# Patient Record
Sex: MALE | Race: WHITE | NOT HISPANIC OR LATINO | URBAN - METROPOLITAN AREA
[De-identification: names, ages, dates, MRNs, and addresses within clinical notes are randomized per-mention and may not be internally consistent; named-entity substitution may affect disease eponyms.]

---

## 2018-04-15 ENCOUNTER — EMERGENCY (EMERGENCY)
Facility: HOSPITAL | Age: 48
LOS: 0 days | Discharge: LEFT BEFORE DISCHARGE | End: 2018-04-16
Attending: EMERGENCY MEDICINE

## 2018-04-15 VITALS
RESPIRATION RATE: 18 BRPM | SYSTOLIC BLOOD PRESSURE: 136 MMHG | OXYGEN SATURATION: 96 % | HEART RATE: 84 BPM | DIASTOLIC BLOOD PRESSURE: 81 MMHG | TEMPERATURE: 98 F

## 2018-04-15 DIAGNOSIS — Z02.9 ENCOUNTER FOR ADMINISTRATIVE EXAMINATIONS, UNSPECIFIED: ICD-10-CM

## 2018-04-15 NOTE — ED ADULT TRIAGE NOTE - CHIEF COMPLAINT QUOTE
lower leg redness and swelling, hx of DVT and PE, had kendrick filter placed 5 years ago. not on blood thinners currently. drove from [place] x 2 weeks ago

## 2018-04-16 NOTE — ED ADULT NURSE NOTE - CHIEF COMPLAINT QUOTE
lower leg redness and swelling, hx of DVT and PE, had kendrick filter placed 5 years ago. not on blood thinners currently. drove from [place] x 2 weeks ago

## 2018-04-18 DIAGNOSIS — Z02.9 ENCOUNTER FOR ADMINISTRATIVE EXAMINATIONS, UNSPECIFIED: ICD-10-CM

## 2018-08-15 PROBLEM — Z00.00 ENCOUNTER FOR PREVENTIVE HEALTH EXAMINATION: Status: ACTIVE | Noted: 2018-08-15

## 2018-08-24 ENCOUNTER — APPOINTMENT (OUTPATIENT)
Dept: VASCULAR SURGERY | Facility: CLINIC | Age: 48
End: 2018-08-24

## 2018-12-17 ENCOUNTER — APPOINTMENT (OUTPATIENT)
Dept: VASCULAR SURGERY | Facility: CLINIC | Age: 48
End: 2018-12-17

## 2019-01-04 ENCOUNTER — OUTPATIENT (OUTPATIENT)
Dept: OUTPATIENT SERVICES | Facility: HOSPITAL | Age: 49
LOS: 1 days | Discharge: HOME | End: 2019-01-04

## 2019-01-04 DIAGNOSIS — F11.20 OPIOID DEPENDENCE, UNCOMPLICATED: ICD-10-CM

## 2019-01-14 ENCOUNTER — OUTPATIENT (OUTPATIENT)
Dept: OUTPATIENT SERVICES | Facility: HOSPITAL | Age: 49
LOS: 1 days | Discharge: HOME | End: 2019-01-14

## 2019-01-14 DIAGNOSIS — F11.20 OPIOID DEPENDENCE, UNCOMPLICATED: ICD-10-CM

## 2019-01-16 ENCOUNTER — APPOINTMENT (OUTPATIENT)
Dept: VASCULAR SURGERY | Facility: CLINIC | Age: 49
End: 2019-01-16
Payer: MEDICAID

## 2019-01-16 DIAGNOSIS — Z86.711 PERSONAL HISTORY OF PULMONARY EMBOLISM: ICD-10-CM

## 2019-01-16 DIAGNOSIS — Z86.79 PERSONAL HISTORY OF OTHER DISEASES OF THE CIRCULATORY SYSTEM: ICD-10-CM

## 2019-01-16 DIAGNOSIS — Z87.898 PERSONAL HISTORY OF OTHER SPECIFIED CONDITIONS: ICD-10-CM

## 2019-01-16 DIAGNOSIS — E11.628 TYPE 2 DIABETES MELLITUS WITH OTHER SKIN COMPLICATIONS: ICD-10-CM

## 2019-01-16 DIAGNOSIS — Z86.718 PERSONAL HISTORY OF OTHER VENOUS THROMBOSIS AND EMBOLISM: ICD-10-CM

## 2019-01-16 PROCEDURE — 99203 OFFICE O/P NEW LOW 30 MIN: CPT

## 2019-01-16 PROCEDURE — 93970 EXTREMITY STUDY: CPT

## 2019-01-16 RX ORDER — BUPRENORPHINE HCL/NALOXONE HCL 8 MG-2 MG
TABLET, SUBLINGUAL SUBLINGUAL
Refills: 0 | Status: ACTIVE | COMMUNITY

## 2019-01-16 NOTE — CONSULT LETTER
[Dear  ___] : Dear  [unfilled], [Consult Letter:] : I had the pleasure of evaluating your patient, [unfilled]. [Please see my note below.] : Please see my note below. [FreeTextEntry2] : Dr. Huynh

## 2019-01-16 NOTE — DATA REVIEWED
[FreeTextEntry1] : Venous duplex of both legs today showed chronic DVT left popliteal vein, no acute DVT.\par \par Venous duplex of IVC showed patent IVC filter.

## 2019-01-16 NOTE — HISTORY OF PRESENT ILLNESS
[FreeTextEntry1] : 48 years old male with history of multiple episodes on DVT/PE, substance abuse, hypertension presents as he wants his IVC filter removed. I performed left leg venous thrombolysis and placement of IVC filter in July 2014 for DVT and PE. He was on coumadin for sometime and than stopped it by himself. He never followed up in the office. Not taking any anticoagulants for few years. Recently released from jail. Has chronic leg swelling and venous stasis changes worse in left leg.

## 2019-01-16 NOTE — PHYSICAL EXAM
[Normal Breath Sounds] : Normal breath sounds [Normal Heart Sounds] : normal heart sounds [2+] : left 2+ [Ankle Swelling (On Exam)] : present [] : bilaterally [Ankle Swelling Bilaterally] : severe [Alert] : alert [Oriented to Person] : oriented to person [Oriented to Place] : oriented to place [Varicose Veins Of Lower Extremities] : not present [Abdomen Masses] : No abdominal masses [Abdomen Tenderness] : ~T ~M No abdominal tenderness [Abdominal Bruit] : no abdominal bruit  [de-identified] : tracheostomy scar [FreeTextEntry1] : Left leg swelling and venous stasis changes worse than right

## 2019-01-18 ENCOUNTER — OUTPATIENT (OUTPATIENT)
Dept: OUTPATIENT SERVICES | Facility: HOSPITAL | Age: 49
LOS: 1 days | Discharge: HOME | End: 2019-01-18

## 2019-01-18 DIAGNOSIS — F11.20 OPIOID DEPENDENCE, UNCOMPLICATED: ICD-10-CM

## 2019-01-28 ENCOUNTER — OUTPATIENT (OUTPATIENT)
Dept: OUTPATIENT SERVICES | Facility: HOSPITAL | Age: 49
LOS: 1 days | Discharge: HOME | End: 2019-01-28

## 2019-01-28 DIAGNOSIS — F11.20 OPIOID DEPENDENCE, UNCOMPLICATED: ICD-10-CM

## 2019-01-30 ENCOUNTER — APPOINTMENT (OUTPATIENT)
Dept: INTERNAL MEDICINE | Facility: HOSPITAL | Age: 49
End: 2019-01-30

## 2019-02-08 ENCOUNTER — APPOINTMENT (OUTPATIENT)
Dept: VASCULAR SURGERY | Facility: HOSPITAL | Age: 49
End: 2019-02-08

## 2019-02-08 ENCOUNTER — OUTPATIENT (OUTPATIENT)
Dept: OUTPATIENT SERVICES | Facility: HOSPITAL | Age: 49
LOS: 1 days | Discharge: HOME | End: 2019-02-08

## 2019-02-08 DIAGNOSIS — F11.20 OPIOID DEPENDENCE, UNCOMPLICATED: ICD-10-CM

## 2019-02-25 ENCOUNTER — OUTPATIENT (OUTPATIENT)
Dept: OUTPATIENT SERVICES | Facility: HOSPITAL | Age: 49
LOS: 1 days | Discharge: HOME | End: 2019-02-25

## 2019-02-25 DIAGNOSIS — F11.20 OPIOID DEPENDENCE, UNCOMPLICATED: ICD-10-CM

## 2019-03-07 ENCOUNTER — OUTPATIENT (OUTPATIENT)
Dept: OUTPATIENT SERVICES | Facility: HOSPITAL | Age: 49
LOS: 1 days | Discharge: HOME | End: 2019-03-07

## 2019-03-07 VITALS
HEART RATE: 76 BPM | TEMPERATURE: 98 F | SYSTOLIC BLOOD PRESSURE: 110 MMHG | HEIGHT: 73 IN | DIASTOLIC BLOOD PRESSURE: 80 MMHG | OXYGEN SATURATION: 98 % | WEIGHT: 286.6 LBS | RESPIRATION RATE: 16 BRPM

## 2019-03-07 DIAGNOSIS — Z86.718 PERSONAL HISTORY OF OTHER VENOUS THROMBOSIS AND EMBOLISM: ICD-10-CM

## 2019-03-07 DIAGNOSIS — F11.20 OPIOID DEPENDENCE, UNCOMPLICATED: ICD-10-CM

## 2019-03-07 DIAGNOSIS — Z01.818 ENCOUNTER FOR OTHER PREPROCEDURAL EXAMINATION: ICD-10-CM

## 2019-03-07 DIAGNOSIS — Z98.890 OTHER SPECIFIED POSTPROCEDURAL STATES: Chronic | ICD-10-CM

## 2019-03-07 DIAGNOSIS — Z79.899 OTHER LONG TERM (CURRENT) DRUG THERAPY: ICD-10-CM

## 2019-03-07 LAB
ALBUMIN SERPL ELPH-MCNC: 4.3 G/DL — SIGNIFICANT CHANGE UP (ref 3.5–5.2)
ALP SERPL-CCNC: 66 U/L — SIGNIFICANT CHANGE UP (ref 30–115)
ALT FLD-CCNC: 21 U/L — SIGNIFICANT CHANGE UP (ref 0–41)
ANION GAP SERPL CALC-SCNC: 14 MMOL/L — SIGNIFICANT CHANGE UP (ref 7–14)
APTT BLD: 29.5 SEC — SIGNIFICANT CHANGE UP (ref 27–39.2)
AST SERPL-CCNC: 16 U/L — SIGNIFICANT CHANGE UP (ref 0–41)
BASOPHILS # BLD AUTO: 0.02 K/UL — SIGNIFICANT CHANGE UP (ref 0–0.2)
BASOPHILS NFR BLD AUTO: 0.4 % — SIGNIFICANT CHANGE UP (ref 0–1)
BILIRUB SERPL-MCNC: 0.6 MG/DL — SIGNIFICANT CHANGE UP (ref 0.2–1.2)
BUN SERPL-MCNC: 19 MG/DL — SIGNIFICANT CHANGE UP (ref 10–20)
CALCIUM SERPL-MCNC: 8.8 MG/DL — SIGNIFICANT CHANGE UP (ref 8.5–10.1)
CHLORIDE SERPL-SCNC: 104 MMOL/L — SIGNIFICANT CHANGE UP (ref 98–110)
CO2 SERPL-SCNC: 24 MMOL/L — SIGNIFICANT CHANGE UP (ref 17–32)
CREAT SERPL-MCNC: 0.9 MG/DL — SIGNIFICANT CHANGE UP (ref 0.7–1.5)
EOSINOPHIL # BLD AUTO: 0.09 K/UL — SIGNIFICANT CHANGE UP (ref 0–0.7)
EOSINOPHIL NFR BLD AUTO: 1.8 % — SIGNIFICANT CHANGE UP (ref 0–8)
GLUCOSE SERPL-MCNC: 199 MG/DL — HIGH (ref 70–99)
HCT VFR BLD CALC: 43.3 % — SIGNIFICANT CHANGE UP (ref 42–52)
HGB BLD-MCNC: 14.4 G/DL — SIGNIFICANT CHANGE UP (ref 14–18)
IMM GRANULOCYTES NFR BLD AUTO: 0.2 % — SIGNIFICANT CHANGE UP (ref 0.1–0.3)
INR BLD: 1.04 RATIO — SIGNIFICANT CHANGE UP (ref 0.65–1.3)
LYMPHOCYTES # BLD AUTO: 1.74 K/UL — SIGNIFICANT CHANGE UP (ref 1.2–3.4)
LYMPHOCYTES # BLD AUTO: 35.5 % — SIGNIFICANT CHANGE UP (ref 20.5–51.1)
MCHC RBC-ENTMCNC: 30.8 PG — SIGNIFICANT CHANGE UP (ref 27–31)
MCHC RBC-ENTMCNC: 33.3 G/DL — SIGNIFICANT CHANGE UP (ref 32–37)
MCV RBC AUTO: 92.5 FL — SIGNIFICANT CHANGE UP (ref 80–94)
MONOCYTES # BLD AUTO: 0.39 K/UL — SIGNIFICANT CHANGE UP (ref 0.1–0.6)
MONOCYTES NFR BLD AUTO: 8 % — SIGNIFICANT CHANGE UP (ref 1.7–9.3)
NEUTROPHILS # BLD AUTO: 2.65 K/UL — SIGNIFICANT CHANGE UP (ref 1.4–6.5)
NEUTROPHILS NFR BLD AUTO: 54.1 % — SIGNIFICANT CHANGE UP (ref 42.2–75.2)
NRBC # BLD: 0 /100 WBCS — SIGNIFICANT CHANGE UP (ref 0–0)
PLATELET # BLD AUTO: 218 K/UL — SIGNIFICANT CHANGE UP (ref 130–400)
POTASSIUM SERPL-MCNC: 4.6 MMOL/L — SIGNIFICANT CHANGE UP (ref 3.5–5)
POTASSIUM SERPL-SCNC: 4.6 MMOL/L — SIGNIFICANT CHANGE UP (ref 3.5–5)
PROT SERPL-MCNC: 6.5 G/DL — SIGNIFICANT CHANGE UP (ref 6–8)
PROTHROM AB SERPL-ACNC: 12 SEC — SIGNIFICANT CHANGE UP (ref 9.95–12.87)
RBC # BLD: 4.68 M/UL — LOW (ref 4.7–6.1)
RBC # FLD: 12.1 % — SIGNIFICANT CHANGE UP (ref 11.5–14.5)
SODIUM SERPL-SCNC: 142 MMOL/L — SIGNIFICANT CHANGE UP (ref 135–146)
WBC # BLD: 4.9 K/UL — SIGNIFICANT CHANGE UP (ref 4.8–10.8)
WBC # FLD AUTO: 4.9 K/UL — SIGNIFICANT CHANGE UP (ref 4.8–10.8)

## 2019-03-07 RX ORDER — BUPRENORPHINE AND NALOXONE 2; .5 MG/1; MG/1
2 TABLET SUBLINGUAL
Qty: 0 | Refills: 0 | COMMUNITY

## 2019-03-07 NOTE — H&P PST ADULT - HISTORY OF PRESENT ILLNESS
47 Y/O MALE PRESNTS TO PAST WITH HX DVT  PT NOW FOR SCHEDULED PROCEDURE. PT DENIES ANY CP SOB PALP COUGH DYSURIA FEVER URI. PT ABLE TO PEE 1-2 FOS W/O SOB

## 2019-03-12 ENCOUNTER — OUTPATIENT (OUTPATIENT)
Dept: OUTPATIENT SERVICES | Facility: HOSPITAL | Age: 49
LOS: 1 days | Discharge: HOME | End: 2019-03-12

## 2019-03-12 DIAGNOSIS — F11.20 OPIOID DEPENDENCE, UNCOMPLICATED: ICD-10-CM

## 2019-03-12 DIAGNOSIS — Z98.890 OTHER SPECIFIED POSTPROCEDURAL STATES: Chronic | ICD-10-CM

## 2019-03-13 PROBLEM — I82.409 ACUTE EMBOLISM AND THROMBOSIS OF UNSPECIFIED DEEP VEINS OF UNSPECIFIED LOWER EXTREMITY: Chronic | Status: ACTIVE | Noted: 2019-03-07

## 2019-03-13 PROBLEM — I26.99 OTHER PULMONARY EMBOLISM WITHOUT ACUTE COR PULMONALE: Chronic | Status: ACTIVE | Noted: 2019-03-07

## 2019-03-21 ENCOUNTER — OUTPATIENT (OUTPATIENT)
Dept: OUTPATIENT SERVICES | Facility: HOSPITAL | Age: 49
LOS: 1 days | Discharge: HOME | End: 2019-03-21

## 2019-03-21 DIAGNOSIS — Z98.890 OTHER SPECIFIED POSTPROCEDURAL STATES: Chronic | ICD-10-CM

## 2019-03-21 DIAGNOSIS — F11.20 OPIOID DEPENDENCE, UNCOMPLICATED: ICD-10-CM

## 2019-04-04 ENCOUNTER — OUTPATIENT (OUTPATIENT)
Dept: OUTPATIENT SERVICES | Facility: HOSPITAL | Age: 49
LOS: 1 days | Discharge: HOME | End: 2019-04-04

## 2019-04-04 DIAGNOSIS — F11.20 OPIOID DEPENDENCE, UNCOMPLICATED: ICD-10-CM

## 2019-04-04 DIAGNOSIS — Z98.890 OTHER SPECIFIED POSTPROCEDURAL STATES: Chronic | ICD-10-CM

## 2019-04-11 ENCOUNTER — OUTPATIENT (OUTPATIENT)
Dept: OUTPATIENT SERVICES | Facility: HOSPITAL | Age: 49
LOS: 1 days | Discharge: HOME | End: 2019-04-11

## 2019-04-11 DIAGNOSIS — F11.20 OPIOID DEPENDENCE, UNCOMPLICATED: ICD-10-CM

## 2019-04-11 DIAGNOSIS — Z98.890 OTHER SPECIFIED POSTPROCEDURAL STATES: Chronic | ICD-10-CM

## 2019-04-25 ENCOUNTER — OUTPATIENT (OUTPATIENT)
Dept: OUTPATIENT SERVICES | Facility: HOSPITAL | Age: 49
LOS: 1 days | Discharge: HOME | End: 2019-04-25

## 2019-04-25 DIAGNOSIS — Z98.890 OTHER SPECIFIED POSTPROCEDURAL STATES: Chronic | ICD-10-CM

## 2019-04-25 DIAGNOSIS — F11.20 OPIOID DEPENDENCE, UNCOMPLICATED: ICD-10-CM

## 2019-05-23 ENCOUNTER — OUTPATIENT (OUTPATIENT)
Dept: OUTPATIENT SERVICES | Facility: HOSPITAL | Age: 49
LOS: 1 days | Discharge: HOME | End: 2019-05-23

## 2019-05-23 DIAGNOSIS — F11.20 OPIOID DEPENDENCE, UNCOMPLICATED: ICD-10-CM

## 2019-05-23 DIAGNOSIS — Z98.890 OTHER SPECIFIED POSTPROCEDURAL STATES: Chronic | ICD-10-CM

## 2019-06-13 ENCOUNTER — OUTPATIENT (OUTPATIENT)
Dept: OUTPATIENT SERVICES | Facility: HOSPITAL | Age: 49
LOS: 1 days | Discharge: HOME | End: 2019-06-13

## 2019-06-13 DIAGNOSIS — F11.20 OPIOID DEPENDENCE, UNCOMPLICATED: ICD-10-CM

## 2019-06-13 DIAGNOSIS — Z98.890 OTHER SPECIFIED POSTPROCEDURAL STATES: Chronic | ICD-10-CM

## 2019-06-19 ENCOUNTER — OUTPATIENT (OUTPATIENT)
Dept: OUTPATIENT SERVICES | Facility: HOSPITAL | Age: 49
LOS: 1 days | Discharge: HOME | End: 2019-06-19

## 2019-06-19 DIAGNOSIS — Z98.890 OTHER SPECIFIED POSTPROCEDURAL STATES: Chronic | ICD-10-CM

## 2019-06-19 DIAGNOSIS — F11.20 OPIOID DEPENDENCE, UNCOMPLICATED: ICD-10-CM

## 2019-06-27 ENCOUNTER — OUTPATIENT (OUTPATIENT)
Dept: OUTPATIENT SERVICES | Facility: HOSPITAL | Age: 49
LOS: 1 days | Discharge: HOME | End: 2019-06-27

## 2019-06-27 DIAGNOSIS — F11.20 OPIOID DEPENDENCE, UNCOMPLICATED: ICD-10-CM

## 2019-06-27 DIAGNOSIS — Z98.890 OTHER SPECIFIED POSTPROCEDURAL STATES: Chronic | ICD-10-CM

## 2020-03-19 ENCOUNTER — APPOINTMENT (OUTPATIENT)
Dept: VASCULAR SURGERY | Facility: CLINIC | Age: 50
End: 2020-03-19
Payer: MEDICAID

## 2020-03-19 PROCEDURE — 99213 OFFICE O/P EST LOW 20 MIN: CPT

## 2020-03-19 PROCEDURE — 93970 EXTREMITY STUDY: CPT

## 2020-03-19 NOTE — HISTORY OF PRESENT ILLNESS
[FreeTextEntry1] : 49 years old male with history of multiple episodes on DVT/PE, substance abuse, hypertension presents as he wants his IVC filter removed. He underwent left leg venous thrombolysis and placement of IVC filter in July 2014 for DVT and PE. He was on Coumadin for sometime and than stopped it by himself. Not taking any anticoagulants for few years. Has chronic leg swelling and venous stasis changes worse in left leg. He c/o pain in the mid back after doing push ups and is concerned about IVC filter.

## 2020-03-19 NOTE — DATA REVIEWED
[FreeTextEntry1] : I performed a venous duplex which was medically necessary to evaluate for DVT. It showed chronic changes in the left popliteal vein and patent IVC filter.\par

## 2020-03-19 NOTE — ASSESSMENT
[FreeTextEntry1] : 49 years old male with history of multiple episodes on DVT/PE, substance abuse, hypertension presents as he wants his IVC filter removed. He underwent left leg venous thrombolysis and placement of IVC filter in July 2014 for DVT and PE. He was on Coumadin for sometime and than stopped it by himself. Not taking any anticoagulants for few years. Has chronic leg swelling and venous stasis changes worse in left leg. He c/o pain in the mid back after doing push ups and is concerned about IVC filter. \par I performed a venous duplex which was medically necessary to evaluate for DVT. It showed chronic changes in the left popliteal vein and patent IVC filter.\par I would like to obtain a CT venogram to further evaluate the patency of the IVC filter and I will see him back after the CT venogram.

## 2020-03-20 LAB
BUN SERPL-MCNC: 15 MG/DL
CREAT SERPL-MCNC: 1 MG/DL

## 2020-04-02 DIAGNOSIS — L03.115 CELLULITIS OF RIGHT LOWER LIMB: ICD-10-CM

## 2020-04-13 ENCOUNTER — APPOINTMENT (OUTPATIENT)
Dept: VASCULAR SURGERY | Facility: CLINIC | Age: 50
End: 2020-04-13
Payer: MEDICAID

## 2020-04-13 PROCEDURE — 99215 OFFICE O/P EST HI 40 MIN: CPT

## 2020-04-13 RX ORDER — CEPHALEXIN 500 MG/1
500 CAPSULE ORAL 4 TIMES DAILY
Qty: 40 | Refills: 0 | Status: ACTIVE | COMMUNITY
Start: 2020-04-02 | End: 1900-01-01

## 2020-04-13 NOTE — ASSESSMENT
[FreeTextEntry1] : Left thigh abscess aspirated with limited success under sterile conditions.  Denies fever, although erythema persists.  Offered admission to hospital for surgical I and D but pt refused.  Will renew Abx and see pt in 1 week,k.  He understands to come to hospital if it worsens or he develops fever

## 2020-04-20 ENCOUNTER — APPOINTMENT (OUTPATIENT)
Dept: VASCULAR SURGERY | Facility: CLINIC | Age: 50
End: 2020-04-20

## 2020-05-19 ENCOUNTER — OUTPATIENT (OUTPATIENT)
Dept: OUTPATIENT SERVICES | Facility: HOSPITAL | Age: 50
LOS: 1 days | Discharge: HOME | End: 2020-05-19
Payer: MEDICAID

## 2020-05-19 DIAGNOSIS — F11.20 OPIOID DEPENDENCE, UNCOMPLICATED: ICD-10-CM

## 2020-05-19 DIAGNOSIS — Z98.890 OTHER SPECIFIED POSTPROCEDURAL STATES: Chronic | ICD-10-CM

## 2020-05-19 LAB
A1C WITH ESTIMATED AVERAGE GLUCOSE RESULT: 5.9 % — HIGH (ref 4–5.6)
ALBUMIN SERPL ELPH-MCNC: 4.5 G/DL — SIGNIFICANT CHANGE UP (ref 3.5–5.2)
ALP SERPL-CCNC: 65 U/L — SIGNIFICANT CHANGE UP (ref 30–115)
ALT FLD-CCNC: 21 U/L — SIGNIFICANT CHANGE UP (ref 0–41)
ANION GAP SERPL CALC-SCNC: 13 MMOL/L — SIGNIFICANT CHANGE UP (ref 7–14)
AST SERPL-CCNC: 19 U/L — SIGNIFICANT CHANGE UP (ref 0–41)
BILIRUB SERPL-MCNC: 0.3 MG/DL — SIGNIFICANT CHANGE UP (ref 0.2–1.2)
BUN SERPL-MCNC: 21 MG/DL — HIGH (ref 10–20)
CALCIUM SERPL-MCNC: 9 MG/DL — SIGNIFICANT CHANGE UP (ref 8.5–10.1)
CHLORIDE SERPL-SCNC: 107 MMOL/L — SIGNIFICANT CHANGE UP (ref 98–110)
CHOLEST SERPL-MCNC: 152 MG/DL — SIGNIFICANT CHANGE UP (ref 100–200)
CO2 SERPL-SCNC: 20 MMOL/L — SIGNIFICANT CHANGE UP (ref 17–32)
CREAT SERPL-MCNC: 0.8 MG/DL — SIGNIFICANT CHANGE UP (ref 0.7–1.5)
ESTIMATED AVERAGE GLUCOSE: 123 MG/DL — HIGH (ref 68–114)
ETHANOL SERPL-MCNC: <10 MG/DL — SIGNIFICANT CHANGE UP
GLUCOSE SERPL-MCNC: 131 MG/DL — HIGH (ref 70–99)
HCT VFR BLD CALC: 40.5 % — LOW (ref 42–52)
HDLC SERPL-MCNC: 60 MG/DL — SIGNIFICANT CHANGE UP
HGB BLD-MCNC: 13.5 G/DL — LOW (ref 14–18)
LIPID PNL WITH DIRECT LDL SERPL: 84 MG/DL — SIGNIFICANT CHANGE UP (ref 4–129)
MAGNESIUM SERPL-MCNC: 1.8 MG/DL — SIGNIFICANT CHANGE UP (ref 1.8–2.4)
MCHC RBC-ENTMCNC: 31.5 PG — HIGH (ref 27–31)
MCHC RBC-ENTMCNC: 33.3 G/DL — SIGNIFICANT CHANGE UP (ref 32–37)
MCV RBC AUTO: 94.6 FL — HIGH (ref 80–94)
NRBC # BLD: 0 /100 WBCS — SIGNIFICANT CHANGE UP (ref 0–0)
PLATELET # BLD AUTO: 278 K/UL — SIGNIFICANT CHANGE UP (ref 130–400)
POTASSIUM SERPL-MCNC: 4.7 MMOL/L — SIGNIFICANT CHANGE UP (ref 3.5–5)
POTASSIUM SERPL-SCNC: 4.7 MMOL/L — SIGNIFICANT CHANGE UP (ref 3.5–5)
PROT SERPL-MCNC: 6.9 G/DL — SIGNIFICANT CHANGE UP (ref 6–8)
RBC # BLD: 4.28 M/UL — LOW (ref 4.7–6.1)
RBC # FLD: 13.1 % — SIGNIFICANT CHANGE UP (ref 11.5–14.5)
SODIUM SERPL-SCNC: 140 MMOL/L — SIGNIFICANT CHANGE UP (ref 135–146)
TOTAL CHOLESTEROL/HDL RATIO MEASUREMENT: 2.5 RATIO — LOW (ref 4–5.5)
TRIGL SERPL-MCNC: 73 MG/DL — SIGNIFICANT CHANGE UP (ref 10–149)
WBC # BLD: 7.05 K/UL — SIGNIFICANT CHANGE UP (ref 4.8–10.8)
WBC # FLD AUTO: 7.05 K/UL — SIGNIFICANT CHANGE UP (ref 4.8–10.8)

## 2020-05-19 PROCEDURE — 99215 OFFICE O/P EST HI 40 MIN: CPT

## 2020-05-20 LAB
APPEARANCE UR: CLEAR — SIGNIFICANT CHANGE UP
BILIRUB UR-MCNC: NEGATIVE — SIGNIFICANT CHANGE UP
COLOR SPEC: SIGNIFICANT CHANGE UP
DIFF PNL FLD: NEGATIVE — SIGNIFICANT CHANGE UP
GLUCOSE UR QL: NEGATIVE — SIGNIFICANT CHANGE UP
HAV IGM SER-ACNC: SIGNIFICANT CHANGE UP
HBV CORE IGM SER-ACNC: SIGNIFICANT CHANGE UP
HBV SURFACE AG SER-ACNC: SIGNIFICANT CHANGE UP
HCV AB S/CO SERPL IA: 0.08 S/CO — SIGNIFICANT CHANGE UP (ref 0–0.99)
HCV AB SERPL-IMP: SIGNIFICANT CHANGE UP
HIV 1+2 AB+HIV1 P24 AG SERPL QL IA: SIGNIFICANT CHANGE UP
KETONES UR-MCNC: NEGATIVE — SIGNIFICANT CHANGE UP
LEUKOCYTE ESTERASE UR-ACNC: NEGATIVE — SIGNIFICANT CHANGE UP
NITRITE UR-MCNC: NEGATIVE — SIGNIFICANT CHANGE UP
PH UR: 6 — SIGNIFICANT CHANGE UP (ref 5–8)
PROT UR-MCNC: NEGATIVE — SIGNIFICANT CHANGE UP
SP GR SPEC: 1.02 — SIGNIFICANT CHANGE UP (ref 1.01–1.02)
T PALLIDUM AB TITR SER: NEGATIVE — SIGNIFICANT CHANGE UP
UROBILINOGEN FLD QL: SIGNIFICANT CHANGE UP

## 2020-05-21 LAB
GAMMA INTERFERON BACKGROUND BLD IA-ACNC: 0.01 IU/ML — SIGNIFICANT CHANGE UP
M TB IFN-G BLD-IMP: NEGATIVE — SIGNIFICANT CHANGE UP
M TB IFN-G CD4+ BCKGRND COR BLD-ACNC: 0 IU/ML — SIGNIFICANT CHANGE UP
M TB IFN-G CD4+CD8+ BCKGRND COR BLD-ACNC: 0 IU/ML — SIGNIFICANT CHANGE UP
QUANT TB PLUS MITOGEN MINUS NIL: 7.59 IU/ML — SIGNIFICANT CHANGE UP

## 2020-05-26 ENCOUNTER — OUTPATIENT (OUTPATIENT)
Dept: OUTPATIENT SERVICES | Facility: HOSPITAL | Age: 50
LOS: 1 days | Discharge: HOME | End: 2020-05-26
Payer: MEDICAID

## 2020-05-26 DIAGNOSIS — Z98.890 OTHER SPECIFIED POSTPROCEDURAL STATES: Chronic | ICD-10-CM

## 2020-05-26 DIAGNOSIS — F11.20 OPIOID DEPENDENCE, UNCOMPLICATED: ICD-10-CM

## 2020-05-26 PROCEDURE — 99212 OFFICE O/P EST SF 10 MIN: CPT

## 2020-05-27 ENCOUNTER — OUTPATIENT (OUTPATIENT)
Dept: OUTPATIENT SERVICES | Facility: HOSPITAL | Age: 50
LOS: 1 days | Discharge: HOME | End: 2020-05-27

## 2020-05-27 DIAGNOSIS — Z98.890 OTHER SPECIFIED POSTPROCEDURAL STATES: Chronic | ICD-10-CM

## 2020-05-27 DIAGNOSIS — F11.20 OPIOID DEPENDENCE, UNCOMPLICATED: ICD-10-CM

## 2020-06-05 ENCOUNTER — OUTPATIENT (OUTPATIENT)
Dept: OUTPATIENT SERVICES | Facility: HOSPITAL | Age: 50
LOS: 1 days | Discharge: HOME | End: 2020-06-05
Payer: MEDICAID

## 2020-06-05 DIAGNOSIS — F11.20 OPIOID DEPENDENCE, UNCOMPLICATED: ICD-10-CM

## 2020-06-05 DIAGNOSIS — Z98.890 OTHER SPECIFIED POSTPROCEDURAL STATES: Chronic | ICD-10-CM

## 2020-06-05 PROCEDURE — 99212 OFFICE O/P EST SF 10 MIN: CPT

## 2020-06-10 ENCOUNTER — OUTPATIENT (OUTPATIENT)
Dept: OUTPATIENT SERVICES | Facility: HOSPITAL | Age: 50
LOS: 1 days | Discharge: HOME | End: 2020-06-10

## 2020-06-10 DIAGNOSIS — Z98.890 OTHER SPECIFIED POSTPROCEDURAL STATES: Chronic | ICD-10-CM

## 2020-06-10 DIAGNOSIS — F11.20 OPIOID DEPENDENCE, UNCOMPLICATED: ICD-10-CM

## 2020-06-24 ENCOUNTER — OUTPATIENT (OUTPATIENT)
Dept: OUTPATIENT SERVICES | Facility: HOSPITAL | Age: 50
LOS: 1 days | Discharge: HOME | End: 2020-06-24
Payer: MEDICAID

## 2020-06-24 DIAGNOSIS — F11.20 OPIOID DEPENDENCE, UNCOMPLICATED: ICD-10-CM

## 2020-06-24 DIAGNOSIS — Z98.890 OTHER SPECIFIED POSTPROCEDURAL STATES: Chronic | ICD-10-CM

## 2020-06-24 PROCEDURE — 99212 OFFICE O/P EST SF 10 MIN: CPT

## 2020-07-15 ENCOUNTER — OUTPATIENT (OUTPATIENT)
Dept: OUTPATIENT SERVICES | Facility: HOSPITAL | Age: 50
LOS: 1 days | Discharge: HOME | End: 2020-07-15
Payer: MEDICAID

## 2020-07-15 DIAGNOSIS — F11.20 OPIOID DEPENDENCE, UNCOMPLICATED: ICD-10-CM

## 2020-07-15 DIAGNOSIS — Z98.890 OTHER SPECIFIED POSTPROCEDURAL STATES: Chronic | ICD-10-CM

## 2020-07-15 PROCEDURE — 99212 OFFICE O/P EST SF 10 MIN: CPT

## 2020-08-12 ENCOUNTER — OUTPATIENT (OUTPATIENT)
Dept: OUTPATIENT SERVICES | Facility: HOSPITAL | Age: 50
LOS: 1 days | Discharge: HOME | End: 2020-08-12
Payer: MEDICAID

## 2020-08-12 DIAGNOSIS — F11.20 OPIOID DEPENDENCE, UNCOMPLICATED: ICD-10-CM

## 2020-08-12 DIAGNOSIS — Z98.890 OTHER SPECIFIED POSTPROCEDURAL STATES: Chronic | ICD-10-CM

## 2020-08-12 PROCEDURE — 99212 OFFICE O/P EST SF 10 MIN: CPT

## 2020-09-15 ENCOUNTER — OUTPATIENT (OUTPATIENT)
Dept: OUTPATIENT SERVICES | Facility: HOSPITAL | Age: 50
LOS: 1 days | Discharge: HOME | End: 2020-09-15

## 2020-09-15 DIAGNOSIS — F11.20 OPIOID DEPENDENCE, UNCOMPLICATED: ICD-10-CM

## 2020-09-15 DIAGNOSIS — Z98.890 OTHER SPECIFIED POSTPROCEDURAL STATES: Chronic | ICD-10-CM

## 2020-10-08 ENCOUNTER — OUTPATIENT (OUTPATIENT)
Dept: OUTPATIENT SERVICES | Facility: HOSPITAL | Age: 50
LOS: 1 days | Discharge: HOME | End: 2020-10-08

## 2020-10-08 DIAGNOSIS — Z98.890 OTHER SPECIFIED POSTPROCEDURAL STATES: Chronic | ICD-10-CM

## 2020-10-08 DIAGNOSIS — F11.20 OPIOID DEPENDENCE, UNCOMPLICATED: ICD-10-CM

## 2020-11-05 ENCOUNTER — OUTPATIENT (OUTPATIENT)
Dept: OUTPATIENT SERVICES | Facility: HOSPITAL | Age: 50
LOS: 1 days | Discharge: HOME | End: 2020-11-05

## 2020-11-05 DIAGNOSIS — Z98.890 OTHER SPECIFIED POSTPROCEDURAL STATES: Chronic | ICD-10-CM

## 2020-11-05 DIAGNOSIS — F11.20 OPIOID DEPENDENCE, UNCOMPLICATED: ICD-10-CM

## 2020-11-07 ENCOUNTER — TRANSCRIPTION ENCOUNTER (OUTPATIENT)
Age: 50
End: 2020-11-07

## 2020-11-11 ENCOUNTER — OUTPATIENT (OUTPATIENT)
Dept: OUTPATIENT SERVICES | Facility: HOSPITAL | Age: 50
LOS: 1 days | Discharge: HOME | End: 2020-11-11

## 2020-11-11 DIAGNOSIS — Z98.890 OTHER SPECIFIED POSTPROCEDURAL STATES: Chronic | ICD-10-CM

## 2020-11-11 DIAGNOSIS — F11.20 OPIOID DEPENDENCE, UNCOMPLICATED: ICD-10-CM

## 2020-12-08 ENCOUNTER — OUTPATIENT (OUTPATIENT)
Dept: OUTPATIENT SERVICES | Facility: HOSPITAL | Age: 50
LOS: 1 days | Discharge: HOME | End: 2020-12-08

## 2020-12-08 DIAGNOSIS — F11.20 OPIOID DEPENDENCE, UNCOMPLICATED: ICD-10-CM

## 2020-12-08 DIAGNOSIS — Z98.890 OTHER SPECIFIED POSTPROCEDURAL STATES: Chronic | ICD-10-CM

## 2021-01-13 ENCOUNTER — OUTPATIENT (OUTPATIENT)
Dept: OUTPATIENT SERVICES | Facility: HOSPITAL | Age: 51
LOS: 1 days | Discharge: HOME | End: 2021-01-13

## 2021-01-13 DIAGNOSIS — F11.20 OPIOID DEPENDENCE, UNCOMPLICATED: ICD-10-CM

## 2021-01-13 DIAGNOSIS — Z98.890 OTHER SPECIFIED POSTPROCEDURAL STATES: Chronic | ICD-10-CM

## 2021-02-09 ENCOUNTER — OUTPATIENT (OUTPATIENT)
Dept: OUTPATIENT SERVICES | Facility: HOSPITAL | Age: 51
LOS: 1 days | Discharge: HOME | End: 2021-02-09

## 2021-02-09 DIAGNOSIS — F11.20 OPIOID DEPENDENCE, UNCOMPLICATED: ICD-10-CM

## 2021-02-09 DIAGNOSIS — Z98.890 OTHER SPECIFIED POSTPROCEDURAL STATES: Chronic | ICD-10-CM

## 2021-02-18 ENCOUNTER — APPOINTMENT (OUTPATIENT)
Dept: PSYCHIATRY | Facility: CLINIC | Age: 51
End: 2021-02-18

## 2021-02-18 ENCOUNTER — OUTPATIENT (OUTPATIENT)
Dept: OUTPATIENT SERVICES | Facility: HOSPITAL | Age: 51
LOS: 1 days | Discharge: HOME | End: 2021-02-18

## 2021-02-18 DIAGNOSIS — Z98.890 OTHER SPECIFIED POSTPROCEDURAL STATES: Chronic | ICD-10-CM

## 2021-02-18 DIAGNOSIS — F11.20 OPIOID DEPENDENCE, UNCOMPLICATED: ICD-10-CM

## 2021-03-08 ENCOUNTER — OUTPATIENT (OUTPATIENT)
Dept: OUTPATIENT SERVICES | Facility: HOSPITAL | Age: 51
LOS: 1 days | Discharge: HOME | End: 2021-03-08

## 2021-03-08 DIAGNOSIS — Z98.890 OTHER SPECIFIED POSTPROCEDURAL STATES: Chronic | ICD-10-CM

## 2021-03-08 DIAGNOSIS — F11.20 OPIOID DEPENDENCE, UNCOMPLICATED: ICD-10-CM

## 2021-03-16 ENCOUNTER — OUTPATIENT (OUTPATIENT)
Dept: OUTPATIENT SERVICES | Facility: HOSPITAL | Age: 51
LOS: 1 days | Discharge: HOME | End: 2021-03-16

## 2021-03-16 DIAGNOSIS — F11.20 OPIOID DEPENDENCE, UNCOMPLICATED: ICD-10-CM

## 2021-03-16 DIAGNOSIS — Z98.890 OTHER SPECIFIED POSTPROCEDURAL STATES: Chronic | ICD-10-CM

## 2021-04-14 ENCOUNTER — APPOINTMENT (OUTPATIENT)
Dept: PSYCHIATRY | Facility: CLINIC | Age: 51
End: 2021-04-14

## 2021-04-14 ENCOUNTER — OUTPATIENT (OUTPATIENT)
Dept: OUTPATIENT SERVICES | Facility: HOSPITAL | Age: 51
LOS: 1 days | Discharge: HOME | End: 2021-04-14

## 2021-04-14 ENCOUNTER — EMERGENCY (EMERGENCY)
Facility: HOSPITAL | Age: 51
LOS: 0 days | Discharge: HOME | End: 2021-04-14
Attending: EMERGENCY MEDICINE | Admitting: EMERGENCY MEDICINE
Payer: MEDICAID

## 2021-04-14 VITALS
HEIGHT: 73 IN | WEIGHT: 274.92 LBS | SYSTOLIC BLOOD PRESSURE: 130 MMHG | TEMPERATURE: 97 F | RESPIRATION RATE: 20 BRPM | HEART RATE: 75 BPM | OXYGEN SATURATION: 96 % | DIASTOLIC BLOOD PRESSURE: 78 MMHG

## 2021-04-14 DIAGNOSIS — M79.662 PAIN IN LEFT LOWER LEG: ICD-10-CM

## 2021-04-14 DIAGNOSIS — Z79.899 OTHER LONG TERM (CURRENT) DRUG THERAPY: ICD-10-CM

## 2021-04-14 DIAGNOSIS — Z98.890 OTHER SPECIFIED POSTPROCEDURAL STATES: Chronic | ICD-10-CM

## 2021-04-14 DIAGNOSIS — Z98.890 OTHER SPECIFIED POSTPROCEDURAL STATES: ICD-10-CM

## 2021-04-14 DIAGNOSIS — F11.20 OPIOID DEPENDENCE, UNCOMPLICATED: ICD-10-CM

## 2021-04-14 DIAGNOSIS — Z79.2 LONG TERM (CURRENT) USE OF ANTIBIOTICS: ICD-10-CM

## 2021-04-14 DIAGNOSIS — Z86.718 PERSONAL HISTORY OF OTHER VENOUS THROMBOSIS AND EMBOLISM: ICD-10-CM

## 2021-04-14 DIAGNOSIS — Z86.711 PERSONAL HISTORY OF PULMONARY EMBOLISM: ICD-10-CM

## 2021-04-14 DIAGNOSIS — L03.116 CELLULITIS OF LEFT LOWER LIMB: ICD-10-CM

## 2021-04-14 PROCEDURE — 99283 EMERGENCY DEPT VISIT LOW MDM: CPT

## 2021-04-14 RX ORDER — CEPHALEXIN 500 MG
1 CAPSULE ORAL
Qty: 20 | Refills: 0
Start: 2021-04-14 | End: 2021-04-23

## 2021-04-14 NOTE — ED PROVIDER NOTE - ATTENDING CONTRIBUTION TO CARE
49 yo M presents with c/o wounds to LLE x 1 week.  Pt states he follows with vascular and always has discoloration of skin.  Wounds are new.  Has been on abx in past with improvement.  On exam pt in NAD AAO x 3, + chronic skin changes to LLE, + small wounds x 4 to LLE, no warmth, mild erythma, no calf tenderness, + distal pulses,

## 2021-04-14 NOTE — ED PROVIDER NOTE - CARE PROVIDERS DIRECT ADDRESSES
----- Message from Renetta Downs PA-C sent at 4/23/2019  3:16 PM CDT -----  See if food panel was done ,DirectAddress_Unknown

## 2021-04-14 NOTE — ED PROVIDER NOTE - NS ED ROS FT
Review of Systems:  	•	CONSTITUTIONAL - no fever, no diaphoresis, no chills  	•	SKIN - no rash  	•	HEMATOLOGIC - no bleeding, no bruising  	•	EYES - no eye pain, no blurry vision  	•	ENT - no change in hearing, no sore throat, no ear pain or tinnitus  	•	RESPIRATORY - no shortness of breath, no cough  	•	CARDIAC - no chest pain, no palpitations  	  	•	MUSCULOSKELETAL - no joint paint, no swelling, left leg redness  	•	NEUROLOGIC - no weakness, no headache, no paresthesias, no LOC  	•	PSYCH - no anxiety, non suicidal, non homicidal, no hallucination, no depression

## 2021-04-14 NOTE — ED ADULT TRIAGE NOTE - BP NONINVASIVE DIASTOLIC (MM HG)
Problem: Patient Care Overview  Goal: Plan of Care Review  Outcome: Ongoing (interventions implemented as appropriate)  Spoke with the patient and his spouse regarding the plan of care. Encouraged patient and spouse to ask questions about possible upcoming surgery for LVAD placement and to join support groups. Verbalized understanding will continue to monitor.        78

## 2021-04-14 NOTE — ED PROVIDER NOTE - PHYSICAL EXAMINATION
--EXAM--  VITAL SIGNS: I have reviewed vs documented at present.  CONSTITUTIONAL: Well-developed; well-nourished; in no acute distress.   SKIN: Warm and dry, no acute rash.   HEAD: Normocephalic; atraumatic.    NECK: Supple; non tender.  CARD: S1, S2, Regular rate and rhythm.   RESP: No wheezes, rales or rhonchi.    EXT: Normal ROM. left leg there is chronic changes  noted to leg. there is vascular ulcers noted. there is surrounding erythema   NEURO: Alert, oriented, grossly unremarkable. Strength 5/5 in all extremities. Sensation intact throughout.  PSYCH: Cooperative, appropriate.

## 2021-04-14 NOTE — ED PROVIDER NOTE - CLINICAL SUMMARY MEDICAL DECISION MAKING FREE TEXT BOX
will start trial of po abx.  Pt to f/u with his Vascular specialist. Strict return precautions discussed.   Pt instructed to return if any worsening symptoms or concerns.  They verbalize understanding.

## 2021-04-14 NOTE — ED PROVIDER NOTE - PATIENT PORTAL LINK FT
You can access the FollowMyHealth Patient Portal offered by [facility] by registering at the following website: [email or web address]. By joining [employer]’s FollowMyHealth portal, you will also be able to view your health information using other applications (apps) compatible with our system.

## 2021-04-19 ENCOUNTER — APPOINTMENT (OUTPATIENT)
Dept: VASCULAR SURGERY | Facility: CLINIC | Age: 51
End: 2021-04-19
Payer: MEDICAID

## 2021-04-19 VITALS
HEIGHT: 74 IN | WEIGHT: 220 LBS | TEMPERATURE: 97.5 F | HEART RATE: 80 BPM | SYSTOLIC BLOOD PRESSURE: 90 MMHG | BODY MASS INDEX: 28.23 KG/M2 | DIASTOLIC BLOOD PRESSURE: 60 MMHG

## 2021-04-19 DIAGNOSIS — I82.220 ACUTE EMBOLISM AND THROMBOSIS OF INFERIOR VENA CAVA: ICD-10-CM

## 2021-04-19 DIAGNOSIS — I82.409 ACUTE EMBOLISM AND THROMBOSIS OF UNSPECIFIED DEEP VEINS OF UNSPECIFIED LOWER EXTREMITY: ICD-10-CM

## 2021-04-19 DIAGNOSIS — M79.89 OTHER SPECIFIED SOFT TISSUE DISORDERS: ICD-10-CM

## 2021-04-19 PROCEDURE — 99213 OFFICE O/P EST LOW 20 MIN: CPT

## 2021-04-19 PROCEDURE — 93970 EXTREMITY STUDY: CPT

## 2021-04-19 PROCEDURE — 99072 ADDL SUPL MATRL&STAF TM PHE: CPT

## 2021-04-20 PROBLEM — I82.409 DVT (DEEP VENOUS THROMBOSIS): Status: ACTIVE | Noted: 2020-03-19

## 2021-04-20 PROBLEM — I82.220 IVC THROMBOSIS: Status: ACTIVE | Noted: 2020-03-19

## 2021-04-20 PROBLEM — M79.89 LEG SWELLING: Status: ACTIVE | Noted: 2021-04-20

## 2021-04-20 NOTE — ASSESSMENT
[FreeTextEntry1] : 50 years old male, with history of IVC filter placement.\par Now has complaints of L>R leg swelling and ulcers.\par At this time, there is no active ulceration.\par \par Venous duplex shows patent deep veins, and patent IVC with filter in place, and no thromgus.\par \par Recommended using compression stockings every day.\par Will see him in clinic in 6 months for re-evaluation.

## 2021-04-20 NOTE — HISTORY OF PRESENT ILLNESS
[FreeTextEntry1] : 50 years old male with history of lower extremity DVT and PE x2.\par In 2019, he underwent IVC filter placement.\par He is complaining of on/off ulcers in the lower extremities (L>R)\par and swelling in bilateral lower extremities.

## 2021-04-23 ENCOUNTER — APPOINTMENT (OUTPATIENT)
Dept: PSYCHIATRY | Facility: CLINIC | Age: 51
End: 2021-04-23

## 2021-04-23 ENCOUNTER — OUTPATIENT (OUTPATIENT)
Dept: OUTPATIENT SERVICES | Facility: HOSPITAL | Age: 51
LOS: 1 days | Discharge: HOME | End: 2021-04-23

## 2021-04-23 DIAGNOSIS — F11.20 OPIOID DEPENDENCE, UNCOMPLICATED: ICD-10-CM

## 2021-04-23 DIAGNOSIS — Z98.890 OTHER SPECIFIED POSTPROCEDURAL STATES: Chronic | ICD-10-CM

## 2021-04-26 ENCOUNTER — OUTPATIENT (OUTPATIENT)
Dept: OUTPATIENT SERVICES | Facility: HOSPITAL | Age: 51
LOS: 1 days | Discharge: HOME | End: 2021-04-26

## 2021-04-26 ENCOUNTER — APPOINTMENT (OUTPATIENT)
Dept: PSYCHIATRY | Facility: CLINIC | Age: 51
End: 2021-04-26

## 2021-04-26 DIAGNOSIS — F11.20 OPIOID DEPENDENCE, UNCOMPLICATED: ICD-10-CM

## 2021-04-26 DIAGNOSIS — Z98.890 OTHER SPECIFIED POSTPROCEDURAL STATES: Chronic | ICD-10-CM

## 2021-05-04 ENCOUNTER — APPOINTMENT (OUTPATIENT)
Dept: PSYCHIATRY | Facility: CLINIC | Age: 51
End: 2021-05-04

## 2021-05-04 ENCOUNTER — OUTPATIENT (OUTPATIENT)
Dept: OUTPATIENT SERVICES | Facility: HOSPITAL | Age: 51
LOS: 1 days | Discharge: HOME | End: 2021-05-04

## 2021-05-04 DIAGNOSIS — F11.20 OPIOID DEPENDENCE, UNCOMPLICATED: ICD-10-CM

## 2021-05-04 DIAGNOSIS — Z98.890 OTHER SPECIFIED POSTPROCEDURAL STATES: Chronic | ICD-10-CM

## 2021-05-10 ENCOUNTER — APPOINTMENT (OUTPATIENT)
Dept: PSYCHIATRY | Facility: CLINIC | Age: 51
End: 2021-05-10

## 2021-05-12 ENCOUNTER — OUTPATIENT (OUTPATIENT)
Dept: OUTPATIENT SERVICES | Facility: HOSPITAL | Age: 51
LOS: 1 days | Discharge: HOME | End: 2021-05-12

## 2021-05-12 ENCOUNTER — APPOINTMENT (OUTPATIENT)
Dept: PSYCHIATRY | Facility: CLINIC | Age: 51
End: 2021-05-12

## 2021-05-12 DIAGNOSIS — Z98.890 OTHER SPECIFIED POSTPROCEDURAL STATES: Chronic | ICD-10-CM

## 2021-05-12 DIAGNOSIS — F11.20 OPIOID DEPENDENCE, UNCOMPLICATED: ICD-10-CM

## 2021-05-17 ENCOUNTER — OUTPATIENT (OUTPATIENT)
Dept: OUTPATIENT SERVICES | Facility: HOSPITAL | Age: 51
LOS: 1 days | Discharge: HOME | End: 2021-05-17

## 2021-05-17 ENCOUNTER — APPOINTMENT (OUTPATIENT)
Dept: PSYCHIATRY | Facility: CLINIC | Age: 51
End: 2021-05-17

## 2021-05-17 DIAGNOSIS — Z98.890 OTHER SPECIFIED POSTPROCEDURAL STATES: Chronic | ICD-10-CM

## 2021-05-17 DIAGNOSIS — F11.20 OPIOID DEPENDENCE, UNCOMPLICATED: ICD-10-CM

## 2021-05-24 ENCOUNTER — OUTPATIENT (OUTPATIENT)
Dept: OUTPATIENT SERVICES | Facility: HOSPITAL | Age: 51
LOS: 1 days | Discharge: HOME | End: 2021-05-24

## 2021-05-24 ENCOUNTER — APPOINTMENT (OUTPATIENT)
Dept: PSYCHIATRY | Facility: CLINIC | Age: 51
End: 2021-05-24

## 2021-05-24 DIAGNOSIS — F11.20 OPIOID DEPENDENCE, UNCOMPLICATED: ICD-10-CM

## 2021-05-24 DIAGNOSIS — Z98.890 OTHER SPECIFIED POSTPROCEDURAL STATES: Chronic | ICD-10-CM

## 2021-06-08 ENCOUNTER — APPOINTMENT (OUTPATIENT)
Dept: PSYCHIATRY | Facility: CLINIC | Age: 51
End: 2021-06-08

## 2021-06-08 ENCOUNTER — OUTPATIENT (OUTPATIENT)
Dept: OUTPATIENT SERVICES | Facility: HOSPITAL | Age: 51
LOS: 1 days | Discharge: HOME | End: 2021-06-08

## 2021-06-08 DIAGNOSIS — F11.20 OPIOID DEPENDENCE, UNCOMPLICATED: ICD-10-CM

## 2021-06-08 DIAGNOSIS — Z98.890 OTHER SPECIFIED POSTPROCEDURAL STATES: Chronic | ICD-10-CM

## 2021-06-10 ENCOUNTER — APPOINTMENT (OUTPATIENT)
Dept: PSYCHIATRY | Facility: CLINIC | Age: 51
End: 2021-06-10

## 2021-06-15 ENCOUNTER — OUTPATIENT (OUTPATIENT)
Dept: OUTPATIENT SERVICES | Facility: HOSPITAL | Age: 51
LOS: 1 days | Discharge: HOME | End: 2021-06-15

## 2021-06-15 ENCOUNTER — APPOINTMENT (OUTPATIENT)
Dept: PSYCHIATRY | Facility: CLINIC | Age: 51
End: 2021-06-15

## 2021-06-15 DIAGNOSIS — F11.20 OPIOID DEPENDENCE, UNCOMPLICATED: ICD-10-CM

## 2021-06-15 DIAGNOSIS — Z98.890 OTHER SPECIFIED POSTPROCEDURAL STATES: Chronic | ICD-10-CM

## 2021-07-13 ENCOUNTER — APPOINTMENT (OUTPATIENT)
Dept: PSYCHIATRY | Facility: CLINIC | Age: 51
End: 2021-07-13

## 2021-07-13 ENCOUNTER — OUTPATIENT (OUTPATIENT)
Dept: OUTPATIENT SERVICES | Facility: HOSPITAL | Age: 51
LOS: 1 days | Discharge: HOME | End: 2021-07-13

## 2021-07-13 DIAGNOSIS — F11.20 OPIOID DEPENDENCE, UNCOMPLICATED: ICD-10-CM

## 2021-07-13 DIAGNOSIS — Z98.890 OTHER SPECIFIED POSTPROCEDURAL STATES: Chronic | ICD-10-CM

## 2021-07-16 ENCOUNTER — OUTPATIENT (OUTPATIENT)
Dept: OUTPATIENT SERVICES | Facility: HOSPITAL | Age: 51
LOS: 1 days | Discharge: HOME | End: 2021-07-16
Payer: MEDICAID

## 2021-07-16 ENCOUNTER — APPOINTMENT (OUTPATIENT)
Dept: PSYCHIATRY | Facility: CLINIC | Age: 51
End: 2021-07-16

## 2021-07-16 DIAGNOSIS — F11.20 OPIOID DEPENDENCE, UNCOMPLICATED: ICD-10-CM

## 2021-07-16 DIAGNOSIS — Z98.890 OTHER SPECIFIED POSTPROCEDURAL STATES: Chronic | ICD-10-CM

## 2021-07-16 PROCEDURE — 99213 OFFICE O/P EST LOW 20 MIN: CPT

## 2021-07-28 ENCOUNTER — APPOINTMENT (OUTPATIENT)
Dept: PSYCHIATRY | Facility: CLINIC | Age: 51
End: 2021-07-28

## 2021-07-28 ENCOUNTER — OUTPATIENT (OUTPATIENT)
Dept: OUTPATIENT SERVICES | Facility: HOSPITAL | Age: 51
LOS: 1 days | Discharge: HOME | End: 2021-07-28

## 2021-07-28 DIAGNOSIS — F11.20 OPIOID DEPENDENCE, UNCOMPLICATED: ICD-10-CM

## 2021-07-28 DIAGNOSIS — Z98.890 OTHER SPECIFIED POSTPROCEDURAL STATES: Chronic | ICD-10-CM

## 2021-08-06 ENCOUNTER — APPOINTMENT (OUTPATIENT)
Dept: PSYCHIATRY | Facility: CLINIC | Age: 51
End: 2021-08-06

## 2021-08-09 ENCOUNTER — APPOINTMENT (OUTPATIENT)
Dept: PSYCHIATRY | Facility: CLINIC | Age: 51
End: 2021-08-09

## 2021-08-09 ENCOUNTER — OUTPATIENT (OUTPATIENT)
Dept: OUTPATIENT SERVICES | Facility: HOSPITAL | Age: 51
LOS: 1 days | Discharge: HOME | End: 2021-08-09
Payer: MEDICAID

## 2021-08-09 DIAGNOSIS — F11.20 OPIOID DEPENDENCE, UNCOMPLICATED: ICD-10-CM

## 2021-08-09 DIAGNOSIS — Z98.890 OTHER SPECIFIED POSTPROCEDURAL STATES: Chronic | ICD-10-CM

## 2021-08-09 PROCEDURE — 99213 OFFICE O/P EST LOW 20 MIN: CPT

## 2021-08-23 ENCOUNTER — APPOINTMENT (OUTPATIENT)
Dept: PSYCHIATRY | Facility: CLINIC | Age: 51
End: 2021-08-23

## 2021-08-23 ENCOUNTER — OUTPATIENT (OUTPATIENT)
Dept: OUTPATIENT SERVICES | Facility: HOSPITAL | Age: 51
LOS: 1 days | Discharge: HOME | End: 2021-08-23

## 2021-08-23 DIAGNOSIS — F11.20 OPIOID DEPENDENCE, UNCOMPLICATED: ICD-10-CM

## 2021-08-23 DIAGNOSIS — Z98.890 OTHER SPECIFIED POSTPROCEDURAL STATES: Chronic | ICD-10-CM

## 2021-09-07 ENCOUNTER — APPOINTMENT (OUTPATIENT)
Dept: PSYCHIATRY | Facility: CLINIC | Age: 51
End: 2021-09-07

## 2021-09-08 ENCOUNTER — OUTPATIENT (OUTPATIENT)
Dept: OUTPATIENT SERVICES | Facility: HOSPITAL | Age: 51
LOS: 1 days | Discharge: HOME | End: 2021-09-08

## 2021-09-08 ENCOUNTER — APPOINTMENT (OUTPATIENT)
Dept: PSYCHIATRY | Facility: CLINIC | Age: 51
End: 2021-09-08
Payer: MEDICAID

## 2021-09-08 DIAGNOSIS — Z98.890 OTHER SPECIFIED POSTPROCEDURAL STATES: Chronic | ICD-10-CM

## 2021-09-08 DIAGNOSIS — F11.20 OPIOID DEPENDENCE, UNCOMPLICATED: ICD-10-CM

## 2021-09-08 PROCEDURE — ZZZZZ: CPT

## 2021-09-15 ENCOUNTER — APPOINTMENT (OUTPATIENT)
Dept: PSYCHIATRY | Facility: CLINIC | Age: 51
End: 2021-09-15

## 2021-09-15 ENCOUNTER — OUTPATIENT (OUTPATIENT)
Dept: OUTPATIENT SERVICES | Facility: HOSPITAL | Age: 51
LOS: 1 days | Discharge: HOME | End: 2021-09-15

## 2021-09-15 DIAGNOSIS — Z98.890 OTHER SPECIFIED POSTPROCEDURAL STATES: Chronic | ICD-10-CM

## 2021-09-15 DIAGNOSIS — F11.20 OPIOID DEPENDENCE, UNCOMPLICATED: ICD-10-CM

## 2021-09-29 ENCOUNTER — APPOINTMENT (OUTPATIENT)
Dept: PSYCHIATRY | Facility: CLINIC | Age: 51
End: 2021-09-29

## 2021-10-06 ENCOUNTER — APPOINTMENT (OUTPATIENT)
Dept: PSYCHIATRY | Facility: CLINIC | Age: 51
End: 2021-10-06

## 2021-10-15 ENCOUNTER — OUTPATIENT (OUTPATIENT)
Dept: OUTPATIENT SERVICES | Facility: HOSPITAL | Age: 51
LOS: 1 days | Discharge: HOME | End: 2021-10-15

## 2021-10-15 ENCOUNTER — APPOINTMENT (OUTPATIENT)
Dept: PSYCHIATRY | Facility: CLINIC | Age: 51
End: 2021-10-15

## 2021-10-15 DIAGNOSIS — F11.20 OPIOID DEPENDENCE, UNCOMPLICATED: ICD-10-CM

## 2021-10-15 DIAGNOSIS — Z98.890 OTHER SPECIFIED POSTPROCEDURAL STATES: Chronic | ICD-10-CM

## 2021-10-18 ENCOUNTER — APPOINTMENT (OUTPATIENT)
Dept: VASCULAR SURGERY | Facility: CLINIC | Age: 51
End: 2021-10-18

## 2021-10-29 ENCOUNTER — APPOINTMENT (OUTPATIENT)
Dept: PSYCHIATRY | Facility: CLINIC | Age: 51
End: 2021-10-29

## 2021-11-12 ENCOUNTER — APPOINTMENT (OUTPATIENT)
Dept: PSYCHIATRY | Facility: CLINIC | Age: 51
End: 2021-11-12

## 2021-11-12 ENCOUNTER — OUTPATIENT (OUTPATIENT)
Dept: OUTPATIENT SERVICES | Facility: HOSPITAL | Age: 51
LOS: 1 days | Discharge: HOME | End: 2021-11-12
Payer: MEDICAID

## 2021-11-12 DIAGNOSIS — Z98.890 OTHER SPECIFIED POSTPROCEDURAL STATES: Chronic | ICD-10-CM

## 2021-11-12 DIAGNOSIS — F11.20 OPIOID DEPENDENCE, UNCOMPLICATED: ICD-10-CM

## 2021-11-12 PROCEDURE — 99212 OFFICE O/P EST SF 10 MIN: CPT

## 2021-11-17 ENCOUNTER — APPOINTMENT (OUTPATIENT)
Dept: PSYCHIATRY | Facility: CLINIC | Age: 51
End: 2021-11-17

## 2021-11-18 ENCOUNTER — APPOINTMENT (OUTPATIENT)
Dept: PSYCHIATRY | Facility: CLINIC | Age: 51
End: 2021-11-18

## 2021-12-07 ENCOUNTER — APPOINTMENT (OUTPATIENT)
Dept: PSYCHIATRY | Facility: CLINIC | Age: 51
End: 2021-12-07

## 2021-12-07 ENCOUNTER — OUTPATIENT (OUTPATIENT)
Dept: OUTPATIENT SERVICES | Facility: HOSPITAL | Age: 51
LOS: 1 days | Discharge: HOME | End: 2021-12-07

## 2021-12-07 DIAGNOSIS — F10.20 ALCOHOL DEPENDENCE, UNCOMPLICATED: ICD-10-CM

## 2021-12-07 DIAGNOSIS — Z98.890 OTHER SPECIFIED POSTPROCEDURAL STATES: Chronic | ICD-10-CM

## 2021-12-13 ENCOUNTER — APPOINTMENT (OUTPATIENT)
Dept: PSYCHIATRY | Facility: CLINIC | Age: 51
End: 2021-12-13

## 2021-12-14 ENCOUNTER — APPOINTMENT (OUTPATIENT)
Dept: PSYCHIATRY | Facility: CLINIC | Age: 51
End: 2021-12-14

## 2021-12-14 ENCOUNTER — OUTPATIENT (OUTPATIENT)
Dept: OUTPATIENT SERVICES | Facility: HOSPITAL | Age: 51
LOS: 1 days | Discharge: HOME | End: 2021-12-14
Payer: MEDICAID

## 2021-12-14 DIAGNOSIS — F11.20 OPIOID DEPENDENCE, UNCOMPLICATED: ICD-10-CM

## 2021-12-14 DIAGNOSIS — Z98.890 OTHER SPECIFIED POSTPROCEDURAL STATES: Chronic | ICD-10-CM

## 2021-12-14 PROCEDURE — 99212 OFFICE O/P EST SF 10 MIN: CPT

## 2021-12-16 ENCOUNTER — APPOINTMENT (OUTPATIENT)
Dept: PSYCHIATRY | Facility: CLINIC | Age: 51
End: 2021-12-16

## 2021-12-21 ENCOUNTER — APPOINTMENT (OUTPATIENT)
Dept: PSYCHIATRY | Facility: CLINIC | Age: 51
End: 2021-12-21

## 2021-12-21 ENCOUNTER — OUTPATIENT (OUTPATIENT)
Dept: OUTPATIENT SERVICES | Facility: HOSPITAL | Age: 51
LOS: 1 days | Discharge: HOME | End: 2021-12-21

## 2021-12-21 DIAGNOSIS — F11.20 OPIOID DEPENDENCE, UNCOMPLICATED: ICD-10-CM

## 2021-12-21 DIAGNOSIS — Z98.890 OTHER SPECIFIED POSTPROCEDURAL STATES: Chronic | ICD-10-CM

## 2022-01-11 ENCOUNTER — APPOINTMENT (OUTPATIENT)
Dept: PSYCHIATRY | Facility: CLINIC | Age: 52
End: 2022-01-11

## 2022-01-11 ENCOUNTER — OUTPATIENT (OUTPATIENT)
Dept: OUTPATIENT SERVICES | Facility: HOSPITAL | Age: 52
LOS: 1 days | Discharge: HOME | End: 2022-01-11
Payer: MEDICAID

## 2022-01-11 DIAGNOSIS — F11.20 OPIOID DEPENDENCE, UNCOMPLICATED: ICD-10-CM

## 2022-01-11 DIAGNOSIS — Z98.890 OTHER SPECIFIED POSTPROCEDURAL STATES: Chronic | ICD-10-CM

## 2022-01-11 PROCEDURE — 99212 OFFICE O/P EST SF 10 MIN: CPT

## 2022-01-13 ENCOUNTER — APPOINTMENT (OUTPATIENT)
Dept: PSYCHIATRY | Facility: CLINIC | Age: 52
End: 2022-01-13

## 2022-01-20 ENCOUNTER — APPOINTMENT (OUTPATIENT)
Dept: PSYCHIATRY | Facility: CLINIC | Age: 52
End: 2022-01-20

## 2022-01-27 ENCOUNTER — EMERGENCY (EMERGENCY)
Facility: HOSPITAL | Age: 52
LOS: 0 days | Discharge: LEFT BEFORE DISCHARGE | End: 2022-01-28
Attending: EMERGENCY MEDICINE | Admitting: EMERGENCY MEDICINE
Payer: MEDICAID

## 2022-01-27 ENCOUNTER — APPOINTMENT (OUTPATIENT)
Dept: PSYCHIATRY | Facility: CLINIC | Age: 52
End: 2022-01-27

## 2022-01-27 ENCOUNTER — OUTPATIENT (OUTPATIENT)
Dept: OUTPATIENT SERVICES | Facility: HOSPITAL | Age: 52
LOS: 1 days | Discharge: HOME | End: 2022-01-27

## 2022-01-27 VITALS
DIASTOLIC BLOOD PRESSURE: 81 MMHG | TEMPERATURE: 98 F | HEIGHT: 73 IN | RESPIRATION RATE: 20 BRPM | OXYGEN SATURATION: 95 % | SYSTOLIC BLOOD PRESSURE: 154 MMHG | HEART RATE: 98 BPM

## 2022-01-27 DIAGNOSIS — F17.200 NICOTINE DEPENDENCE, UNSPECIFIED, UNCOMPLICATED: ICD-10-CM

## 2022-01-27 DIAGNOSIS — R00.2 PALPITATIONS: ICD-10-CM

## 2022-01-27 DIAGNOSIS — R06.02 SHORTNESS OF BREATH: ICD-10-CM

## 2022-01-27 DIAGNOSIS — Z98.890 OTHER SPECIFIED POSTPROCEDURAL STATES: Chronic | ICD-10-CM

## 2022-01-27 DIAGNOSIS — F10.20 ALCOHOL DEPENDENCE, UNCOMPLICATED: ICD-10-CM

## 2022-01-27 DIAGNOSIS — I45.10 UNSPECIFIED RIGHT BUNDLE-BRANCH BLOCK: ICD-10-CM

## 2022-01-27 DIAGNOSIS — Z86.718 PERSONAL HISTORY OF OTHER VENOUS THROMBOSIS AND EMBOLISM: ICD-10-CM

## 2022-01-27 DIAGNOSIS — R55 SYNCOPE AND COLLAPSE: ICD-10-CM

## 2022-01-27 LAB
ALBUMIN SERPL ELPH-MCNC: 4.9 G/DL — SIGNIFICANT CHANGE UP (ref 3.5–5.2)
ALP SERPL-CCNC: 66 U/L — SIGNIFICANT CHANGE UP (ref 30–115)
ALT FLD-CCNC: 16 U/L — SIGNIFICANT CHANGE UP (ref 0–41)
ANION GAP SERPL CALC-SCNC: 15 MMOL/L — HIGH (ref 7–14)
AST SERPL-CCNC: 18 U/L — SIGNIFICANT CHANGE UP (ref 0–41)
BASOPHILS # BLD AUTO: 0.06 K/UL — SIGNIFICANT CHANGE UP (ref 0–0.2)
BASOPHILS NFR BLD AUTO: 0.8 % — SIGNIFICANT CHANGE UP (ref 0–1)
BILIRUB SERPL-MCNC: 0.5 MG/DL — SIGNIFICANT CHANGE UP (ref 0.2–1.2)
BUN SERPL-MCNC: 27 MG/DL — HIGH (ref 10–20)
CALCIUM SERPL-MCNC: 9.4 MG/DL — SIGNIFICANT CHANGE UP (ref 8.5–10.1)
CHLORIDE SERPL-SCNC: 103 MMOL/L — SIGNIFICANT CHANGE UP (ref 98–110)
CO2 SERPL-SCNC: 21 MMOL/L — SIGNIFICANT CHANGE UP (ref 17–32)
CREAT SERPL-MCNC: 1 MG/DL — SIGNIFICANT CHANGE UP (ref 0.7–1.5)
D DIMER BLD IA.RAPID-MCNC: 428 NG/ML DDU — HIGH (ref 0–230)
EOSINOPHIL # BLD AUTO: 0.11 K/UL — SIGNIFICANT CHANGE UP (ref 0–0.7)
EOSINOPHIL NFR BLD AUTO: 1.4 % — SIGNIFICANT CHANGE UP (ref 0–8)
GLUCOSE SERPL-MCNC: 148 MG/DL — HIGH (ref 70–99)
HCT VFR BLD CALC: 41.7 % — LOW (ref 42–52)
HGB BLD-MCNC: 14.4 G/DL — SIGNIFICANT CHANGE UP (ref 14–18)
IMM GRANULOCYTES NFR BLD AUTO: 0.3 % — SIGNIFICANT CHANGE UP (ref 0.1–0.3)
LYMPHOCYTES # BLD AUTO: 2.87 K/UL — SIGNIFICANT CHANGE UP (ref 1.2–3.4)
LYMPHOCYTES # BLD AUTO: 36.5 % — SIGNIFICANT CHANGE UP (ref 20.5–51.1)
MAGNESIUM SERPL-MCNC: 1.7 MG/DL — LOW (ref 1.8–2.4)
MCHC RBC-ENTMCNC: 32.2 PG — HIGH (ref 27–31)
MCHC RBC-ENTMCNC: 34.5 G/DL — SIGNIFICANT CHANGE UP (ref 32–37)
MCV RBC AUTO: 93.3 FL — SIGNIFICANT CHANGE UP (ref 80–94)
MONOCYTES # BLD AUTO: 0.68 K/UL — HIGH (ref 0.1–0.6)
MONOCYTES NFR BLD AUTO: 8.6 % — SIGNIFICANT CHANGE UP (ref 1.7–9.3)
NEUTROPHILS # BLD AUTO: 4.13 K/UL — SIGNIFICANT CHANGE UP (ref 1.4–6.5)
NEUTROPHILS NFR BLD AUTO: 52.4 % — SIGNIFICANT CHANGE UP (ref 42.2–75.2)
NRBC # BLD: 0 /100 WBCS — SIGNIFICANT CHANGE UP (ref 0–0)
PLATELET # BLD AUTO: 255 K/UL — SIGNIFICANT CHANGE UP (ref 130–400)
POTASSIUM SERPL-MCNC: 3.8 MMOL/L — SIGNIFICANT CHANGE UP (ref 3.5–5)
POTASSIUM SERPL-SCNC: 3.8 MMOL/L — SIGNIFICANT CHANGE UP (ref 3.5–5)
PROT SERPL-MCNC: 7.5 G/DL — SIGNIFICANT CHANGE UP (ref 6–8)
RBC # BLD: 4.47 M/UL — LOW (ref 4.7–6.1)
RBC # FLD: 12.2 % — SIGNIFICANT CHANGE UP (ref 11.5–14.5)
SODIUM SERPL-SCNC: 139 MMOL/L — SIGNIFICANT CHANGE UP (ref 135–146)
TROPONIN T SERPL-MCNC: <0.01 NG/ML — SIGNIFICANT CHANGE UP
WBC # BLD: 7.87 K/UL — SIGNIFICANT CHANGE UP (ref 4.8–10.8)
WBC # FLD AUTO: 7.87 K/UL — SIGNIFICANT CHANGE UP (ref 4.8–10.8)

## 2022-01-27 PROCEDURE — 71045 X-RAY EXAM CHEST 1 VIEW: CPT | Mod: 26

## 2022-01-27 PROCEDURE — 99285 EMERGENCY DEPT VISIT HI MDM: CPT

## 2022-01-27 PROCEDURE — 93010 ELECTROCARDIOGRAM REPORT: CPT

## 2022-01-27 RX ORDER — MAGNESIUM OXIDE 400 MG ORAL TABLET 241.3 MG
400 TABLET ORAL ONCE
Refills: 0 | Status: DISCONTINUED | OUTPATIENT
Start: 2022-01-27 | End: 2022-01-28

## 2022-01-27 NOTE — ED PROVIDER NOTE - CLINICAL SUMMARY MEDICAL DECISION MAKING FREE TEXT BOX
51yM p/w palpitations and near syncope w/o CP.  VSS and no focal neuro deficits.  EKG w/o ischemia or arrhyhtmia.  CXR w/o ptx or pna.  Labs w/ elevated d-dimer in addition to mild hypomagnesemia.  Mg repleted and recommended CTA chest for ?PE, but pt unwilling to stay in the ED further.    The patient wishes to leave against medical advice.  I have discussed the risks, benefits and alternatives (including the possibility of worsening of disease, pain, permanent disability, and/or death) with the patient and his/her family (if available).  The patient voices understanding of these risks, benefits, and alternatives and still wishes to sign out against medical advice.  The patient is awake, alert, oriented x 3 and has demonstrated capacity to refuse/direct care.  I have advised the patient that they can and should return immediately should they develop any worse/different/additional symptoms, or if they change their mind and want to continue their care. Patient has full capacity and has verbalized understanding.  Given detailed returned precautions.

## 2022-01-27 NOTE — ED ADULT TRIAGE NOTE - CHIEF COMPLAINT QUOTE
Pt stated "I think somebody slipped me something.".  I took a sip of water and felt dizzy and my heart beating fast.

## 2022-01-27 NOTE — ED PROVIDER NOTE - PATIENT PORTAL LINK FT
You can access the FollowMyHealth Patient Portal offered by [facility] by registering at the following website: [email or web address]. By joining [employer]’s FollowMyHealth portal, you will also be able to view your health information using other applications (apps) compatible with our system.

## 2022-01-27 NOTE — ED PROVIDER NOTE - NS ED ROS FT
Review of Systems:  CONSTITUTIONAL: No fever, No diaphoresis  SKIN: No rash  HEMATOLOGIC: No abnormal bleeding or bruising  EYES: No eye pain, No blurred vision  ENT: No change in hearing, No sore throat, No neck pain, No rhinorrhea,  RESPIRATORY: + shortness of breath, No cough  CARDIAC: No chest pain, No palpitations  GI: No abdominal pain, No nausea, No vomiting, No diarrhea, No constipation  MUSCULOSKELETAL: No joint paint, No swelling, No back pain  NEUROLOGIC: No numbness, No focal weakness, No headache, + dizziness  All other systems negative, unless specified in HPI

## 2022-01-27 NOTE — ED PROVIDER NOTE - ATTENDING CONTRIBUTION TO CARE
51yM hx DVT x 2 formerly on coumadin s/p IVC filter now no longer on a/c p/w palpitations and presyncope.  Pt admits to palpitations this afternoon, including heart racing and mild SOB w/o CP.  Says he felt so poorly he felt like he was going to pass out, so he came to the ED.  Palpitations and near-syncopal feeling have improved, but still feeling mild palpitations.  No fever or cough.  Not currently on a/c.    well appearing overweight middle aged male in NAD  no resp distress

## 2022-01-27 NOTE — ED PROVIDER NOTE - OBJECTIVE STATEMENT
52 yo M with PMH opioid use, hx DVT, PE not on A/C who presents with transient episode of palpitations and SOB.  Sudden onset 1 hour PTA.  while drinking water.  Immediately drove to ER, symptoms nearly resolved.  Pt with mild SOB on arrival. Pt denies fevers, chills, back pain, chest pain, n/v/d.

## 2022-01-27 NOTE — ED ADULT NURSE NOTE - NSIMPLEMENTINTERV_GEN_ALL_ED
Implemented All Universal Safety Interventions:  [place] to call system. Call bell, personal items and telephone within reach. Instruct patient to call for assistance. Room bathroom lighting operational. Non-slip footwear when patient is off stretcher. Physically safe environment: no spills, clutter or unnecessary equipment. Stretcher in lowest position, wheels locked, appropriate side rails in place.

## 2022-01-27 NOTE — ED PROVIDER NOTE - CARE PLAN
Principal Discharge DX:	Palpitations  Secondary Diagnosis:	Near syncope  Secondary Diagnosis:	History of DVT of lower extremity   1

## 2022-01-27 NOTE — ED PROVIDER NOTE - PHYSICAL EXAMINATION
CONSTITUTIONAL: Well-developed; well-nourished; in no acute distress, afebrile  SKIN: Warm, dry  EYES: No conjunctival injection. PERRLA. EOMI  ENT: No nasal discharge; oropharynx nonerythematous; airway clear  CARD:  Regular rate and rhythm  RESP: CTAB; No wheezes, crackles, rales or rhonchi  ABD: Soft NTND; No guarding or rebound tenderness  EXT: Normal ROM.  No clubbing or cyanosis.  No edema  NEURO: A&O x3, grossly unremarkable, no focal deficits  PSYCH: Cooperative, appropriate  *Chaperone was used during the encounter

## 2022-02-10 ENCOUNTER — APPOINTMENT (OUTPATIENT)
Dept: PSYCHIATRY | Facility: CLINIC | Age: 52
End: 2022-02-10

## 2022-02-17 ENCOUNTER — APPOINTMENT (OUTPATIENT)
Dept: PSYCHIATRY | Facility: CLINIC | Age: 52
End: 2022-02-17

## 2022-02-17 ENCOUNTER — OUTPATIENT (OUTPATIENT)
Dept: OUTPATIENT SERVICES | Facility: HOSPITAL | Age: 52
LOS: 1 days | Discharge: HOME | End: 2022-02-17

## 2022-02-17 DIAGNOSIS — F10.20 ALCOHOL DEPENDENCE, UNCOMPLICATED: ICD-10-CM

## 2022-02-17 DIAGNOSIS — Z98.890 OTHER SPECIFIED POSTPROCEDURAL STATES: Chronic | ICD-10-CM

## 2022-03-08 ENCOUNTER — APPOINTMENT (OUTPATIENT)
Dept: PSYCHIATRY | Facility: CLINIC | Age: 52
End: 2022-03-08

## 2022-03-17 ENCOUNTER — APPOINTMENT (OUTPATIENT)
Dept: PSYCHIATRY | Facility: CLINIC | Age: 52
End: 2022-03-17

## 2023-08-14 ENCOUNTER — APPOINTMENT (OUTPATIENT)
Dept: VASCULAR SURGERY | Facility: CLINIC | Age: 53
End: 2023-08-14

## 2023-12-18 NOTE — ASSESSMENT
SUBJECTIVE:  John Fraire is a 26 month old female who is accompanied by with Father and Mother who provide the history today    Chief Complaint   Patient presents with    Cough    Diarrhea       Cough  This is a new problem. The current episode started 1 to 4 weeks ago. The problem occurs intermittently. The problem has been gradually worsening. Associated symptoms include coughing. Pertinent negatives include no fever, sore throat or vomiting. Nothing aggravates the symptoms. Treatments tried: OTC cough meds. The treatment provided mild relief.   Diarrhea   The current episode started 3 to 5 days ago. The onset was gradual. The diarrhea occurs 2 to 4 times per day. The problem has not changed since onset.The problem is mild. Associated symptoms include diarrhea and cough. Pertinent negatives include no fever, no vomiting, no rhinorrhea and no sore throat.      attendance +  Cough reported to be worse for past one week    Review of Systems   Constitutional:  Negative for activity change, appetite change and fever.   HENT:  Negative for rhinorrhea and sore throat.    Respiratory:  Positive for cough.    Gastrointestinal:  Positive for diarrhea. Negative for vomiting.     Current Outpatient Medications   Medication Sig Dispense Refill    albuterol (VENTOLIN) (2.5 MG/3ML) 0.083% nebulizer solution Take 3 mLs by nebulization every 4 hours as needed for Wheezing (cough). 360 mL 0    amoxicillin (AMOXIL) 400 MG/5ML suspension 6 ml po bid for ten days 120 mL 0    fluticasone (FLONASE) 50 MCG/ACT nasal spray Spray 1 spray in each nostril at bedtime. 16 g 12     No current facility-administered medications for this visit.       Patient's medications, allergies, past medical, surgical, social and family histories were reviewed and updated as appropriate.    OBJECTIVE:  Visit Vitals  Temp 96.9 °F (36.1 °C) (Temporal)   Wt 12.2 kg (26 lb 14.3 oz)     Physical Exam  Constitutional:       General: She is active.     [FreeTextEntry1] : 48 years old male with multiple history of DVT/PE s/p left leg venous thrombolysis and IVC filter in 2014, presents to have his IVC filter removed. He likely has a hypercoagulable state and should have a thorough hematology evaluation and consultation. His duplex today showed patent IVC filter and no acute DVT, only chronic left popliteal DVT. I offered him removal of IVC filter as outpatient procedure on Feb 8th. He wishes to proceed. Advised for leg elevation and stockings. He would require reflux studies next visit.     Appearance: She is well-developed.   HENT:      Right Ear: Tympanic membrane is bulging.      Left Ear: Tympanic membrane is bulging.      Mouth/Throat:      Mouth: Mucous membranes are moist.   Eyes:      General:         Right eye: No discharge.         Left eye: No discharge.      Pupils: Pupils are equal, round, and reactive to light.   Cardiovascular:      Rate and Rhythm: Regular rhythm.      Heart sounds: S1 normal and S2 normal. No murmur heard.  Pulmonary:      Effort: Pulmonary effort is normal. No respiratory distress.      Breath sounds: Wheezing (scattered) present. No rales.   Abdominal:      Palpations: Abdomen is soft.      Tenderness: There is no abdominal tenderness.   Musculoskeletal:         General: Normal range of motion.      Cervical back: Neck supple.   Skin:     General: Skin is moist.   Neurological:      Mental Status: She is alert.         ASSESSMENT/PLAN:    Problem List Items Addressed This Visit    None  Visit Diagnoses       Other non-recurrent acute nonsuppurative otitis media of both ears    -  Primary    Relevant Medications    amoxicillin (AMOXIL) 400 MG/5ML suspension    Viral URI with cough        Mild intermittent reactive airway disease without complication        Relevant Medications    albuterol (VENTOLIN) (2.5 MG/3ML) 0.083% nebulizer solution               Assessment     1. Other non-recurrent acute nonsuppurative otitis media of both ears    2. Viral URI with cough    3. Mild intermittent reactive airway disease without complication        Instructed to call if problem worsens or does not improve within the next 24 hours otherwise follow-up as needed.    Discussed worrisome symptoms,advised symptomatic management     The mother and father indicated understanding of the diagnosis and agreed with the plan of care.      Magi Arevalo MD

## 2024-10-31 ENCOUNTER — NON-APPOINTMENT (OUTPATIENT)
Age: 54
End: 2024-10-31

## 2024-11-01 ENCOUNTER — INPATIENT (INPATIENT)
Facility: HOSPITAL | Age: 54
LOS: 4 days | Discharge: HOME | DRG: 383 | End: 2024-11-06
Attending: STUDENT IN AN ORGANIZED HEALTH CARE EDUCATION/TRAINING PROGRAM | Admitting: INTERNAL MEDICINE
Payer: MEDICAID

## 2024-11-01 VITALS
HEART RATE: 80 BPM | TEMPERATURE: 98 F | WEIGHT: 289.91 LBS | DIASTOLIC BLOOD PRESSURE: 96 MMHG | SYSTOLIC BLOOD PRESSURE: 155 MMHG | RESPIRATION RATE: 18 BRPM | OXYGEN SATURATION: 97 % | HEIGHT: 73 IN

## 2024-11-01 DIAGNOSIS — L02.611 CUTANEOUS ABSCESS OF RIGHT FOOT: ICD-10-CM

## 2024-11-01 DIAGNOSIS — Z98.890 OTHER SPECIFIED POSTPROCEDURAL STATES: Chronic | ICD-10-CM

## 2024-11-01 LAB
ALBUMIN SERPL ELPH-MCNC: 3.6 G/DL — SIGNIFICANT CHANGE UP (ref 3.5–5.2)
ALP SERPL-CCNC: 83 U/L — SIGNIFICANT CHANGE UP (ref 30–115)
ALT FLD-CCNC: 13 U/L — SIGNIFICANT CHANGE UP (ref 0–41)
ANION GAP SERPL CALC-SCNC: 9 MMOL/L — SIGNIFICANT CHANGE UP (ref 7–14)
APTT BLD: 28.1 SEC — SIGNIFICANT CHANGE UP (ref 27–39.2)
AST SERPL-CCNC: 13 U/L — SIGNIFICANT CHANGE UP (ref 0–41)
BASOPHILS # BLD AUTO: 0.02 K/UL — SIGNIFICANT CHANGE UP (ref 0–0.2)
BASOPHILS NFR BLD AUTO: 0.2 % — SIGNIFICANT CHANGE UP (ref 0–1)
BILIRUB SERPL-MCNC: 0.5 MG/DL — SIGNIFICANT CHANGE UP (ref 0.2–1.2)
BLD GP AB SCN SERPL QL: SIGNIFICANT CHANGE UP
BUN SERPL-MCNC: 15 MG/DL — SIGNIFICANT CHANGE UP (ref 10–20)
CALCIUM SERPL-MCNC: 8.5 MG/DL — SIGNIFICANT CHANGE UP (ref 8.4–10.5)
CHLORIDE SERPL-SCNC: 105 MMOL/L — SIGNIFICANT CHANGE UP (ref 98–110)
CO2 SERPL-SCNC: 24 MMOL/L — SIGNIFICANT CHANGE UP (ref 17–32)
CREAT SERPL-MCNC: 0.8 MG/DL — SIGNIFICANT CHANGE UP (ref 0.7–1.5)
CRP SERPL-MCNC: 170 MG/L — HIGH
EGFR: 106 ML/MIN/1.73M2 — SIGNIFICANT CHANGE UP
EOSINOPHIL # BLD AUTO: 0.06 K/UL — SIGNIFICANT CHANGE UP (ref 0–0.7)
EOSINOPHIL NFR BLD AUTO: 0.5 % — SIGNIFICANT CHANGE UP (ref 0–8)
ERYTHROCYTE [SEDIMENTATION RATE] IN BLOOD: 40 MM/HR — HIGH (ref 0–10)
GLUCOSE SERPL-MCNC: 265 MG/DL — HIGH (ref 70–99)
HCT VFR BLD CALC: 37.7 % — LOW (ref 42–52)
HGB BLD-MCNC: 12.5 G/DL — LOW (ref 14–18)
IMM GRANULOCYTES NFR BLD AUTO: 0.4 % — HIGH (ref 0.1–0.3)
INR BLD: 1.07 RATIO — SIGNIFICANT CHANGE UP (ref 0.65–1.3)
LACTATE SERPL-SCNC: 1.2 MMOL/L — SIGNIFICANT CHANGE UP (ref 0.7–2)
LYMPHOCYTES # BLD AUTO: 1.37 K/UL — SIGNIFICANT CHANGE UP (ref 1.2–3.4)
LYMPHOCYTES # BLD AUTO: 10.3 % — LOW (ref 20.5–51.1)
MCHC RBC-ENTMCNC: 31.6 PG — HIGH (ref 27–31)
MCHC RBC-ENTMCNC: 33.2 G/DL — SIGNIFICANT CHANGE UP (ref 32–37)
MCV RBC AUTO: 95.2 FL — HIGH (ref 80–94)
MONOCYTES # BLD AUTO: 0.94 K/UL — HIGH (ref 0.1–0.6)
MONOCYTES NFR BLD AUTO: 7.1 % — SIGNIFICANT CHANGE UP (ref 1.7–9.3)
NEUTROPHILS # BLD AUTO: 10.81 K/UL — HIGH (ref 1.4–6.5)
NEUTROPHILS NFR BLD AUTO: 81.5 % — HIGH (ref 42.2–75.2)
NRBC # BLD: 0 /100 WBCS — SIGNIFICANT CHANGE UP (ref 0–0)
PLATELET # BLD AUTO: 255 K/UL — SIGNIFICANT CHANGE UP (ref 130–400)
PMV BLD: 9.8 FL — SIGNIFICANT CHANGE UP (ref 7.4–10.4)
POTASSIUM SERPL-MCNC: 4.5 MMOL/L — SIGNIFICANT CHANGE UP (ref 3.5–5)
POTASSIUM SERPL-SCNC: 4.5 MMOL/L — SIGNIFICANT CHANGE UP (ref 3.5–5)
PROT SERPL-MCNC: 6.4 G/DL — SIGNIFICANT CHANGE UP (ref 6–8)
PROTHROM AB SERPL-ACNC: 12.7 SEC — SIGNIFICANT CHANGE UP (ref 9.95–12.87)
RBC # BLD: 3.96 M/UL — LOW (ref 4.7–6.1)
RBC # FLD: 12.3 % — SIGNIFICANT CHANGE UP (ref 11.5–14.5)
SODIUM SERPL-SCNC: 138 MMOL/L — SIGNIFICANT CHANGE UP (ref 135–146)
WBC # BLD: 13.25 K/UL — HIGH (ref 4.8–10.8)
WBC # FLD AUTO: 13.25 K/UL — HIGH (ref 4.8–10.8)

## 2024-11-01 PROCEDURE — 84100 ASSAY OF PHOSPHORUS: CPT

## 2024-11-01 PROCEDURE — 85025 COMPLETE CBC W/AUTO DIFF WBC: CPT

## 2024-11-01 PROCEDURE — 83735 ASSAY OF MAGNESIUM: CPT

## 2024-11-01 PROCEDURE — 88304 TISSUE EXAM BY PATHOLOGIST: CPT

## 2024-11-01 PROCEDURE — 94760 N-INVAS EAR/PLS OXIMETRY 1: CPT

## 2024-11-01 PROCEDURE — 73610 X-RAY EXAM OF ANKLE: CPT | Mod: 26,RT

## 2024-11-01 PROCEDURE — 80053 COMPREHEN METABOLIC PANEL: CPT

## 2024-11-01 PROCEDURE — 93010 ELECTROCARDIOGRAM REPORT: CPT

## 2024-11-01 PROCEDURE — 85652 RBC SED RATE AUTOMATED: CPT

## 2024-11-01 PROCEDURE — 93970 EXTREMITY STUDY: CPT | Mod: 26

## 2024-11-01 PROCEDURE — 73630 X-RAY EXAM OF FOOT: CPT | Mod: 26,RT

## 2024-11-01 PROCEDURE — 97116 GAIT TRAINING THERAPY: CPT | Mod: GP

## 2024-11-01 PROCEDURE — 87116 MYCOBACTERIA CULTURE: CPT

## 2024-11-01 PROCEDURE — 93005 ELECTROCARDIOGRAM TRACING: CPT

## 2024-11-01 PROCEDURE — 86140 C-REACTIVE PROTEIN: CPT

## 2024-11-01 PROCEDURE — 85027 COMPLETE CBC AUTOMATED: CPT

## 2024-11-01 PROCEDURE — 87075 CULTR BACTERIA EXCEPT BLOOD: CPT

## 2024-11-01 PROCEDURE — 97161 PT EVAL LOW COMPLEX 20 MIN: CPT | Mod: GP

## 2024-11-01 PROCEDURE — 99285 EMERGENCY DEPT VISIT HI MDM: CPT

## 2024-11-01 PROCEDURE — 99222 1ST HOSP IP/OBS MODERATE 55: CPT | Mod: 25,57

## 2024-11-01 PROCEDURE — 36415 COLL VENOUS BLD VENIPUNCTURE: CPT

## 2024-11-01 PROCEDURE — A9579: CPT

## 2024-11-01 PROCEDURE — 93925 LOWER EXTREMITY STUDY: CPT

## 2024-11-01 PROCEDURE — 87077 CULTURE AEROBIC IDENTIFY: CPT

## 2024-11-01 PROCEDURE — 80048 BASIC METABOLIC PNL TOTAL CA: CPT

## 2024-11-01 PROCEDURE — 80061 LIPID PANEL: CPT

## 2024-11-01 PROCEDURE — 99222 1ST HOSP IP/OBS MODERATE 55: CPT

## 2024-11-01 PROCEDURE — 93925 LOWER EXTREMITY STUDY: CPT | Mod: 26

## 2024-11-01 PROCEDURE — 80202 ASSAY OF VANCOMYCIN: CPT

## 2024-11-01 PROCEDURE — 87070 CULTURE OTHR SPECIMN AEROBIC: CPT

## 2024-11-01 PROCEDURE — 97530 THERAPEUTIC ACTIVITIES: CPT | Mod: GP

## 2024-11-01 PROCEDURE — 93970 EXTREMITY STUDY: CPT

## 2024-11-01 PROCEDURE — 83036 HEMOGLOBIN GLYCOSYLATED A1C: CPT

## 2024-11-01 PROCEDURE — 82962 GLUCOSE BLOOD TEST: CPT

## 2024-11-01 PROCEDURE — 87186 SC STD MICRODIL/AGAR DIL: CPT

## 2024-11-01 PROCEDURE — 87206 SMEAR FLUORESCENT/ACID STAI: CPT

## 2024-11-01 PROCEDURE — 73719 MRI LOWER EXTREMITY W/DYE: CPT | Mod: MC,RT

## 2024-11-01 PROCEDURE — 88311 DECALCIFY TISSUE: CPT

## 2024-11-01 RX ORDER — VANCOMYCIN HYDROCHLORIDE 50 MG/ML
2000 KIT ORAL EVERY 12 HOURS
Refills: 0 | Status: DISCONTINUED | OUTPATIENT
Start: 2024-11-01 | End: 2024-11-01

## 2024-11-01 RX ORDER — VANCOMYCIN HYDROCHLORIDE 50 MG/ML
1500 KIT ORAL EVERY 12 HOURS
Refills: 0 | Status: DISCONTINUED | OUTPATIENT
Start: 2024-11-01 | End: 2024-11-02

## 2024-11-01 RX ORDER — CEFEPIME 2 G/1
2000 INJECTION, POWDER, FOR SOLUTION INTRAVENOUS EVERY 8 HOURS
Refills: 0 | Status: DISCONTINUED | OUTPATIENT
Start: 2024-11-01 | End: 2024-11-05

## 2024-11-01 RX ORDER — METRONIDAZOLE 250 MG/1
500 TABLET ORAL EVERY 8 HOURS
Refills: 0 | Status: DISCONTINUED | OUTPATIENT
Start: 2024-11-01 | End: 2024-11-04

## 2024-11-01 RX ORDER — METRONIDAZOLE 250 MG/1
500 TABLET ORAL ONCE
Refills: 0 | Status: COMPLETED | OUTPATIENT
Start: 2024-11-01 | End: 2024-11-01

## 2024-11-01 RX ORDER — ACETAMINOPHEN 500 MG
650 TABLET ORAL EVERY 6 HOURS
Refills: 0 | Status: DISCONTINUED | OUTPATIENT
Start: 2024-11-01 | End: 2024-11-03

## 2024-11-01 RX ORDER — TETANUS AND DIPHTHERIA TOXOIDS ADSORBED 2; 2 [LF]/.5ML; [LF]/.5ML
0.5 INJECTION INTRAMUSCULAR ONCE
Refills: 0 | Status: DISCONTINUED | OUTPATIENT
Start: 2024-11-01 | End: 2024-11-01

## 2024-11-01 RX ORDER — CLOSTRIDIUM TETANI TOXOID ANTIGEN (FORMALDEHYDE INACTIVATED), CORYNEBACTERIUM DIPHTHERIAE TOXOID ANTIGEN (FORMALDEHYDE INACTIVATED), BORDETELLA PERTUSSIS TOXOID ANTIGEN (GLUTARALDEHYDE INACTIVATED), BORDETELLA PERTUSSIS FILAMENTOUS HEMAGGLUTININ ANTIGEN (FORMALDEHYDE INACTIVATED), BORDETELLA PERTUSSIS PERTACTIN ANTIGEN, AND BORDETELLA PERTUSSIS FIMBRIAE 2/3 ANTIGEN 5; 2; 2.5; 5; 3; 5 [LF]/.5ML; [LF]/.5ML; UG/.5ML; UG/.5ML; UG/.5ML; UG/.5ML
0.5 INJECTION, SUSPENSION INTRAMUSCULAR ONCE
Refills: 0 | Status: COMPLETED | OUTPATIENT
Start: 2024-11-01 | End: 2024-11-01

## 2024-11-01 RX ORDER — CEFEPIME 2 G/1
2000 INJECTION, POWDER, FOR SOLUTION INTRAVENOUS ONCE
Refills: 0 | Status: COMPLETED | OUTPATIENT
Start: 2024-11-01 | End: 2024-11-01

## 2024-11-01 RX ORDER — INFLUENZ VIR VAC TV P-SURF2003 15MCG/.5ML
0.5 SYRINGE (ML) INTRAMUSCULAR ONCE
Refills: 0 | Status: DISCONTINUED | OUTPATIENT
Start: 2024-11-01 | End: 2024-11-06

## 2024-11-01 RX ORDER — VANCOMYCIN HYDROCHLORIDE 50 MG/ML
2000 KIT ORAL ONCE
Refills: 0 | Status: COMPLETED | OUTPATIENT
Start: 2024-11-01 | End: 2024-11-01

## 2024-11-01 RX ORDER — BUPRENORPHINE HYDROCHLORIDE, NALOXONE HYDROCHLORIDE 12; 3 MG/1; MG/1
1 FILM, SOLUBLE BUCCAL; SUBLINGUAL THREE TIMES A DAY
Refills: 0 | Status: DISCONTINUED | OUTPATIENT
Start: 2024-11-01 | End: 2024-11-03

## 2024-11-01 RX ORDER — ENOXAPARIN SODIUM 40MG/0.4ML
130 SYRINGE (ML) SUBCUTANEOUS EVERY 12 HOURS
Refills: 0 | Status: DISCONTINUED | OUTPATIENT
Start: 2024-11-01 | End: 2024-11-06

## 2024-11-01 RX ORDER — ENOXAPARIN SODIUM 40MG/0.4ML
40 SYRINGE (ML) SUBCUTANEOUS EVERY 24 HOURS
Refills: 0 | Status: DISCONTINUED | OUTPATIENT
Start: 2024-11-01 | End: 2024-11-01

## 2024-11-01 RX ADMIN — Medication 130 MILLIGRAM(S): at 18:31

## 2024-11-01 RX ADMIN — CEFEPIME 100 MILLIGRAM(S): 2 INJECTION, POWDER, FOR SOLUTION INTRAVENOUS at 11:43

## 2024-11-01 RX ADMIN — Medication 650 MILLIGRAM(S): at 18:30

## 2024-11-01 RX ADMIN — CLOSTRIDIUM TETANI TOXOID ANTIGEN (FORMALDEHYDE INACTIVATED), CORYNEBACTERIUM DIPHTHERIAE TOXOID ANTIGEN (FORMALDEHYDE INACTIVATED), BORDETELLA PERTUSSIS TOXOID ANTIGEN (GLUTARALDEHYDE INACTIVATED), BORDETELLA PERTUSSIS FILAMENTOUS HEMAGGLUTININ ANTIGEN (FORMALDEHYDE INACTIVATED), BORDETELLA PERTUSSIS PERTACTIN ANTIGEN, AND BORDETELLA PERTUSSIS FIMBRIAE 2/3 ANTIGEN 0.5 MILLILITER(S): 5; 2; 2.5; 5; 3; 5 INJECTION, SUSPENSION INTRAMUSCULAR at 10:55

## 2024-11-01 RX ADMIN — CEFEPIME 100 MILLIGRAM(S): 2 INJECTION, POWDER, FOR SOLUTION INTRAVENOUS at 18:31

## 2024-11-01 RX ADMIN — METRONIDAZOLE 100 MILLIGRAM(S): 250 TABLET ORAL at 10:52

## 2024-11-01 RX ADMIN — Medication 75 MILLILITER(S): at 22:52

## 2024-11-01 RX ADMIN — VANCOMYCIN HYDROCHLORIDE 300 MILLIGRAM(S): KIT at 23:57

## 2024-11-01 RX ADMIN — Medication 1000 MILLILITER(S): at 13:31

## 2024-11-01 RX ADMIN — Medication 75 MILLILITER(S): at 18:36

## 2024-11-01 RX ADMIN — Medication 1000 MILLILITER(S): at 10:58

## 2024-11-01 RX ADMIN — VANCOMYCIN HYDROCHLORIDE 250 MILLIGRAM(S): KIT at 12:44

## 2024-11-01 RX ADMIN — Medication 1000 MILLILITER(S): at 12:44

## 2024-11-01 RX ADMIN — BUPRENORPHINE HYDROCHLORIDE, NALOXONE HYDROCHLORIDE 1 TABLET(S): 12; 3 FILM, SOLUBLE BUCCAL; SUBLINGUAL at 21:34

## 2024-11-01 RX ADMIN — METRONIDAZOLE 100 MILLIGRAM(S): 250 TABLET ORAL at 18:31

## 2024-11-01 NOTE — CHART NOTE - NSCHARTNOTEFT_GEN_A_CORE
Patient's surgeyr has been rescheduled to Tomorrow 11/02 at 11am.   Patient will be going with Podiatry to OR, Excisional Debridement of Soft Tissue and Bone of Right Foot.   Please NPO after 1am on Saturday 11/02.  T & S, coags, BMP, CBC.   Optimize lab values.   Obtain medical clearances.    Thank You. n/a

## 2024-11-01 NOTE — ED PROVIDER NOTE - PROGRESS NOTE DETAILS
Code Sepsis activated @ 10:24.     2 L LR bolus ordered. Blood cultures x2 and Lactate ordered. Also ordered R Foot and Ankle Xrays, TDap Vaccine, Vancomycin, Cefepime and Flagyl and Podiatry c/s. Spoke with Podiatry, may go to OR today for I&D and Debridement

## 2024-11-01 NOTE — ED ADULT TRIAGE NOTE - CHIEF COMPLAINT QUOTE
Pt c/o stepped on nail w/ R foot yesterday. Pt removed the nail-bleeding controlled. Pt went to UC and was told he has cellulitis at the site and needs iv abx. Pt had a fever last night to 101

## 2024-11-01 NOTE — ED PROVIDER NOTE - PHYSICAL EXAMINATION
PHYSICAL EXAM:  GENERAL: NAD, lying in bed comfortably  HEAD:  Atraumatic, Normocephalic  EYES: EOMI, PERRLA, conjunctiva and sclera clear  ENT: Moist mucous membranes  NECK: Supple, No JVD  CHEST/LUNG: Clear to auscultation bilaterally; No rales, rhonchi, wheezing, or rubs. Unlabored respirations  HEART: Regular rate and rhythm; No murmurs, rubs, or gallops  ABDOMEN: Bowel sounds present; Soft, Nontender, Nondistended. No hepatomegaly  EXTREMITIES:  2+ Peripheral Pulses, brisk capillary refill. No clubbing, cyanosis, or edema  NERVOUS SYSTEM:  Alert & Oriented X3, speech clear. No deficits   MSK/SKIN: R 1st Metatarsal head abscess and swelling; Left anterior tibia ulcers

## 2024-11-01 NOTE — H&P ADULT - NSICDXFAMILYHX_GEN_ALL_CORE_FT
FAMILY HISTORY:  Father  Still living? Unknown  Family history of brain cancer, Age at diagnosis: Age Unknown

## 2024-11-01 NOTE — PHARMACOTHERAPY INTERVENTION NOTE - COMMENTS
As per policy, ordered a vancomycin level for 11/2 at 4pm in order to assist with vancomycin pharmacokinetic monitoring.    Jia Malave, PharmD, [facility]  Clinical Pharmacy Specialist, Infectious Diseases  Tele-Antimicrobial Stewardship Program (Tele-ASP)  Tele-ASP Phone: [phone]

## 2024-11-01 NOTE — PATIENT PROFILE ADULT - VISION (WITH CORRECTIVE LENSES IF THE PATIENT USUALLY WEARS THEM):
Called prosper son and he is aware, and confirmed. ----- Message from BASIL Ramsey sent at 2/1/2022  3:02 PM EST -----  No pneumonia noted on CXR.   I sent in a steroid.        Normal vision: sees adequately in most situations; can see medication labels, newsprint

## 2024-11-01 NOTE — H&P ADULT - NSHPPHYSICALEXAM_GEN_ALL_CORE
GENERAL: NAD, lying in bed comfortably  HEAD:  Atraumatic, normocephalic  EYES: EOMI, PERRLA, conjunctiva and sclera clear  NECK: Supple, trachea midline, no JVD  HEART: Regular rate and rhythm, no murmurs, rubs, or gallops  LUNGS: Unlabored respirations.  Clear to auscultation bilaterally, no crackles, wheezing, or rhonchi  ABDOMEN: Soft, nontender, nondistended, +BS  EXTREMITIES: 2+ peripheral pulses bilaterally. No clubbing, cyanosis, or edema  NERVOUS SYSTEM:  A&Ox3, moving all extremities, no focal deficits   SKIN: No rashes or lesions GENERAL: NAD, lying in bed comfortably  HEAD:  Atraumatic, normocephalic  EYES: EOMI, PERRLA, conjunctiva and sclera clear  NECK: Supple, trachea midline, no JVD  HEART: Regular rate and rhythm, no murmurs, rubs, or gallops  LUNGS: Unlabored respirations.  Clear to auscultation bilaterally, no crackles, wheezing, or rhonchi  ABDOMEN: Soft, nontender, nondistended, +BS  EXTREMITIES: 2+ peripheral pulses bilaterally. Right foot swelling, erythema, tenderness around 1st metatarsus suspicious of abscess  NERVOUS SYSTEM:  A&Ox3, moving all extremities, no focal deficits   SKIN: Bilat stasis dermatitis, left anterior shin venous ulcers

## 2024-11-01 NOTE — H&P ADULT - NSHPLABSRESULTS_GEN_ALL_CORE
LABS:                          12.5   13.25 )-----------( 255      ( 01 Nov 2024 10:40 )             37.7     11-01    138  |  105  |  15  ----------------------------<  265[H]  4.5   |  24  |  0.8    Ca    8.5      01 Nov 2024 10:40    TPro  6.4  /  Alb  3.6  /  TBili  0.5  /  DBili  x   /  AST  13  /  ALT  13  /  AlkPhos  83  11-01    LIVER FUNCTIONS - ( 01 Nov 2024 10:40 )  Alb: 3.6 g/dL / Pro: 6.4 g/dL / ALK PHOS: 83 U/L / ALT: 13 U/L / AST: 13 U/L / GGT: x           PT/INR - ( 01 Nov 2024 10:40 )   PT: 12.70 sec;   INR: 1.07 ratio         PTT - ( 01 Nov 2024 10:40 )  PTT:28.1 sec  Urinalysis Basic - ( 01 Nov 2024 10:40 )    Color: x / Appearance: x / SG: x / pH: x  Gluc: 265 mg/dL / Ketone: x  / Bili: x / Urobili: x   Blood: x / Protein: x / Nitrite: x   Leuk Esterase: x / RBC: x / WBC x   Sq Epi: x / Non Sq Epi: x / Bacteria: x

## 2024-11-01 NOTE — H&P ADULT - ASSESSMENT
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 Patient removed the nail but he started developing swelling, erythema, tenderness around the area of the right first metatarsus. Yesterday, he developed an episode of fever 101F so he went to urgent care and they referred him to the ED. He hasn't been following with a PCP and suspects he might have diabetes. No recent DTap given.     #Right foot abscess with cellulitis  - Hemodynamically stable, Sat 97% on RA, 101F at home  - WBC 13.25K, Hb 12.5, INR 1.07, Lytes Nl, Crea 0.8, Glu 265, LFTs Nl, lactate 1.2  - R foot/ankle xray: no acute bony changes  - Podiatry cs in ED: plan for urgent I&D in OR. Art duplex of LLE, ESR/CRP, A1C  - FU ESR, CRP, A1C, lipid panel  - FU Duplex LLE Art and venous  - Cs ID  - FU Blood and abscess cultures (post drainage)  - Cs Wound care  - NPO for sx  - IV LR 75cc/h   - s/p DTap  - C/w cefepime 2g IV q8h, flagyl 500mg TID, vancomycin 1g IV q12h    DVT ppx: lovenox  GI ppx: not indicated  AAT  Diet: NPO for now  Dispo: med

## 2024-11-01 NOTE — ED PROVIDER NOTE - ATTENDING CONTRIBUTION TO CARE
53-year-old male no past medical history, does not see any doctors, chart reports DVT/PE but not on AC, presents with right great toe pain.  Patient states he stepped on a nail the day before yesterday.  Went to urgent care yesterday and was sent to ED.  Had a fever of 101 last night.  Right great toe has become red swollen and painful.  No numbness weakness.  No other injuries.  Unknown last Tdap.  Patient states he is on Suboxone, does not take any other meds.    On exam, AFVSS, Well appearing, No acute distress, NCAT, EOMI, PERRLA, MMM, Neck supple, LCTAB, RRR nl s1s2 No mrg, Abdomen Soft NTND, AAOx3, No Focal Deficits, No LE edema or calf TTP, right first MTP edema erythema warmth tenderness area of purulence noted fluctuance 2+ DP pulse 5 out of 5 motor sensation tact light touch, skin intact no open wound to right foot    A/P; right foot cellulitis from stepping on nail, possible abscess as well, x-ray performed no gas, culture sent broad-spectrum IV antibiotics started Vanco cefepime and Flagyl, podiatry consulted, Tdap given in ED, admit to medicine likely needs debridement IV antibiotics ID consult

## 2024-11-01 NOTE — PATIENT PROFILE ADULT - FUNCTIONAL ASSESSMENT - BASIC MOBILITY 2.
Quality 130: Documentation Of Current Medications In The Medical Record: Current Medications Documented Quality 137: Melanoma: Continuity Of Care - Recall System: Patient information entered into a recall system that includes: target date for the next exam specified AND a process to follow up with patients regarding missed or unscheduled appointments Detail Level: Detailed Quality 138: Melanoma: Coordination Of Care: A treatment plan was communicated to the physicians providing continuing care within one month of diagnosis outlining: diagnosis, tumor thickness and a plan for surgery or alternate care. 4 = No assist / stand by assistance

## 2024-11-01 NOTE — H&P ADULT - ATTENDING COMMENTS
53 year old male with history of DVT s/p IVC came to ED for right foot pain and swelling, started after he stepped on a nail two days ago, he noticed swelling, redness and pain, he denies fever, chills, chest pain or SOB.   In the ED vital signs were stable, foot xray    PHYSICAL EXAM:  GENERAL: NAD, well-developed.  HEAD:  Atraumatic, Normocephalic.  EYES: EOMI, PERRLA, conjunctiva and sclera clear.  NECK: Supple, No JVD.  CHEST/LUNG: Clear to auscultation bilaterally; No wheeze.  HEART: Regular rate and rhythm; S1 S2.   ABDOMEN: Soft, Nontender, Nondistended; Bowel sounds present.  EXTREMITIES: right plantar area collection and swelling with erythema extend to the big toe,   PSYCH: AAOx3.  NEUROLOGY: non-focal.  SKIN: left lower leg anterior shin small ulcerative venous lesions,     A/P:   Right plantar foot cellulitis and abscess:   No sepsis on admission.   WBC13K,   Foot Xray showed no collection or OM.   Start on Flagyl, Cefepime and Vancomycin.   Podiatry consulted, plan for OR debridement.     Elevated blood glucose;   possibly undiagnosed DM, [id number], start on Lispro sliding scale, diabetic diet     Left lower leg venous stasis ulcers:   Wound care, check arterial duplex for LE.     Macrocytic anemia: check B12, folate, TSH

## 2024-11-01 NOTE — H&P ADULT - HISTORY OF PRESENT ILLNESS
Pt is a 53 year old male with a PMHx of DVT/PE 10 years ago, s/p IVC which is still in place and not on AC who presented to the ED after developing a Right Foot Abscess after stepping on a nail at work 2 days ago. Pt states he removed the nail on his own and the area started developing swelling and redness around his right first metatarsal. Yesterday, he went to urgent care and they referred him to the ED and they did not give him any antibiotics. Pt also states that he has "poor circulation" and endorses 2 anterior left shin ulcers that he's had for "a very long time". He states he has not seen a physician in "many years" and may also have diabetes. He endorses severe pain to his right 1st  metatarsal, redness, swelling and states he had a fever of 101 F at home last night. He does not remember the last time he had a tetanus vaccine.    Vital Signs Last 24 Hrs  T(C): 36.8 (01 Nov 2024 09:52), Max: 36.8 (01 Nov 2024 09:52)  T(F): 98.2 (01 Nov 2024 09:52), Max: 98.2 (01 Nov 2024 09:52)  HR: 80 (01 Nov 2024 09:52) (80 - 80)  BP: 124/68 (01 Nov 2024 13:32) (124/68 - 155/96)  SpO2: 97% (01 Nov 2024 09:52) (97% - 97%) on RA    Labs: WBC 13.25K, Hb 12.5, INR 1.07, Lytes Nl, Crea 0.8, Glu 265, LFTs Nl, lactate 1.2  R foot/ankle xray: no acute bony changes    Podiatry cs in ED: plan for urgent I&D in OR. Art duplex of LLE, ESR/CRP, A1C    Admitted for R foot abscess with cellulitis. 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 Patient removed the nail but he started developing swelling, erythema, tenderness around the area of the right first metatarsus. Yesterday, he developed an episode of fever 101F so he went to urgent care and they referred him to the ED. He hasn't been following with a PCP and suspects he might have diabetes. No recent DTap given.     Vital Signs Last 24 Hrs  T(C): 36.8 (01 Nov 2024 09:52), Max: 36.8 (01 Nov 2024 09:52)  T(F): 98.2 (01 Nov 2024 09:52), Max: 98.2 (01 Nov 2024 09:52)  HR: 80 (01 Nov 2024 09:52) (80 - 80)  BP: 124/68 (01 Nov 2024 13:32) (124/68 - 155/96)  SpO2: 97% (01 Nov 2024 09:52) (97% - 97%) on RA    Labs: WBC 13.25K, Hb 12.5, INR 1.07, Lytes Nl, Crea 0.8, Glu 265, LFTs Nl, lactate 1.2  R foot/ankle xray: no acute bony changes    Podiatry cs in ED: plan for urgent I&D in OR. Art duplex of LLE, ESR/CRP, A1C    Admitted for R foot abscess with cellulitis.

## 2024-11-01 NOTE — CONSULT NOTE ADULT - ATTENDING COMMENTS
right foot abscess s/p penetrating injury from a nail x 2 days ago  noted fluctuance plantar to first metatarsal head. erythema to the first ray  xrays reviewed  Tdap  OR today for I&D w/ exploration of penetrating injury. Surgical risks d/w patient   keep NPO

## 2024-11-01 NOTE — H&P ADULT - NSICDXPASTMEDICALHX_GEN_ALL_CORE_FT
PAST MEDICAL HISTORY:  DVT (deep venous thrombosis) 5 yr- left  4 yrs- right  pe - 4 yrs    Pulmonary embolism

## 2024-11-01 NOTE — PATIENT PROFILE ADULT - FALL HARM RISK - RISK INTERVENTIONS
Assistance OOB with selected safe patient handling equipment/Communicate Fall Risk and Risk Factors to all staff, patient, and family/Reinforce activity limits and safety measures with patient and family/Visual Cue: Yellow wristband/Bed in lowest position, wheels locked, appropriate side rails in place/Call bell, personal items and telephone in reach/Instruct patient to call for assistance before getting out of bed or chair/Non-slip footwear when patient is out of bed/Kimberly to call system/Physically safe environment - no spills, clutter or unnecessary equipment/Purposeful Proactive Rounding/Room/bathroom lighting operational, light cord in reach

## 2024-11-01 NOTE — CHART NOTE - NSCHARTNOTEFT_GEN_A_CORE
Patient has a history of DVT/PE s/p IVC was previously on coumadin prior to the IVC. Patient was positive for bilateral DVT in the popliteal vein on US along with superficial thrombophlebitis at the right lesser saphenous vein. Patient has no contraindications to AC. Started on therapeutic lovenox.

## 2024-11-01 NOTE — ED PROVIDER NOTE - WR ORDER NAME 2
Ambien      Last Written Prescription Date:  4/27/2022  Last Fill Quantity: 30,   # refills: 1  Last Office Visit: 4/27/2022  Future Office visit:       Routing refill request to provider for review/approval because:  Drug not on the [facility], [facility] or  Health refill protocol or controlled substance    Miki Cruz RN    
Xray Foot AP + Lateral + Oblique, Right

## 2024-11-01 NOTE — PHARMACOTHERAPY INTERVENTION NOTE - COMMENTS
As per policy, adjusted vancomycin dose from 2000 mg IV q12h to 1500 mg IV q12h. As per PrecisePK calculations, the steady-state vancomycin AUC/LEVI on the former dose is 662 mg/L*h, which is greater than the therapeutic range of 400 - 600 mg/L*h. Decreasing the dose is predicted to yield an AUC/LEVI of 497 mg/L*h.    Jia Malave, PharmD, BCIDP  Clinical Pharmacy Specialist, Infectious Diseases  Tele-[facility]timicrobial Stewardship Program (Tele-[facility])  Tele[facility] Phone: [phone]

## 2024-11-01 NOTE — ED PROVIDER NOTE - OBJECTIVE STATEMENT
Pt is a 53 year old male with a PMHx of DVT/PE 10 years ago, s/p IVC which is still in place and not on AC who presented to the ED after developing a Right Foot Abscess after stepping on a nail at work 2 days ago. Pt states he removed the nail on his own and the area started developing swelling and redness around his right first metatarsal. Yesterday, he went to urgent care and they referred him to the ED and they did not give him any antibiotics. Pt also states that he has "poor circulation" and endorses 2 anterior left shin ulcers that he's had for "a very long time". He states he has not seen a physician in "many years" and may also have diabetes. He endorses severe pain to his right 1st  metatarsal, redness, swelling and states he had a fever of 101 F at home last night. He does not remember the last time he had a tetanus vaccine.

## 2024-11-02 ENCOUNTER — RESULT REVIEW (OUTPATIENT)
Age: 54
End: 2024-11-02

## 2024-11-02 LAB
A1C WITH ESTIMATED AVERAGE GLUCOSE RESULT: 7.3 % — HIGH (ref 4–5.6)
ALBUMIN SERPL ELPH-MCNC: 3.2 G/DL — LOW (ref 3.5–5.2)
ALP SERPL-CCNC: 63 U/L — SIGNIFICANT CHANGE UP (ref 30–115)
ALT FLD-CCNC: 10 U/L — SIGNIFICANT CHANGE UP (ref 0–41)
ANION GAP SERPL CALC-SCNC: 12 MMOL/L — SIGNIFICANT CHANGE UP (ref 7–14)
AST SERPL-CCNC: 8 U/L — SIGNIFICANT CHANGE UP (ref 0–41)
BASOPHILS # BLD AUTO: 0.03 K/UL — SIGNIFICANT CHANGE UP (ref 0–0.2)
BASOPHILS NFR BLD AUTO: 0.3 % — SIGNIFICANT CHANGE UP (ref 0–1)
BILIRUB SERPL-MCNC: 0.4 MG/DL — SIGNIFICANT CHANGE UP (ref 0.2–1.2)
BUN SERPL-MCNC: 11 MG/DL — SIGNIFICANT CHANGE UP (ref 10–20)
CALCIUM SERPL-MCNC: 7.9 MG/DL — LOW (ref 8.4–10.5)
CHLORIDE SERPL-SCNC: 103 MMOL/L — SIGNIFICANT CHANGE UP (ref 98–110)
CHOLEST SERPL-MCNC: 97 MG/DL — SIGNIFICANT CHANGE UP
CO2 SERPL-SCNC: 23 MMOL/L — SIGNIFICANT CHANGE UP (ref 17–32)
CREAT SERPL-MCNC: 0.7 MG/DL — SIGNIFICANT CHANGE UP (ref 0.7–1.5)
EGFR: 110 ML/MIN/1.73M2 — SIGNIFICANT CHANGE UP
EOSINOPHIL # BLD AUTO: 0.06 K/UL — SIGNIFICANT CHANGE UP (ref 0–0.7)
EOSINOPHIL NFR BLD AUTO: 0.6 % — SIGNIFICANT CHANGE UP (ref 0–8)
ESTIMATED AVERAGE GLUCOSE: 163 MG/DL — HIGH (ref 68–114)
GLUCOSE BLDC GLUCOMTR-MCNC: 142 MG/DL — HIGH (ref 70–99)
GLUCOSE BLDC GLUCOMTR-MCNC: 152 MG/DL — HIGH (ref 70–99)
GLUCOSE BLDC GLUCOMTR-MCNC: 164 MG/DL — HIGH (ref 70–99)
GLUCOSE SERPL-MCNC: 148 MG/DL — HIGH (ref 70–99)
HCT VFR BLD CALC: 33.6 % — LOW (ref 42–52)
HDLC SERPL-MCNC: 39 MG/DL — LOW
HGB BLD-MCNC: 11.2 G/DL — LOW (ref 14–18)
IMM GRANULOCYTES NFR BLD AUTO: 0.6 % — HIGH (ref 0.1–0.3)
LIPID PNL WITH DIRECT LDL SERPL: 47 MG/DL — SIGNIFICANT CHANGE UP
LYMPHOCYTES # BLD AUTO: 1.78 K/UL — SIGNIFICANT CHANGE UP (ref 1.2–3.4)
LYMPHOCYTES # BLD AUTO: 18.9 % — LOW (ref 20.5–51.1)
MCHC RBC-ENTMCNC: 31.3 PG — HIGH (ref 27–31)
MCHC RBC-ENTMCNC: 33.3 G/DL — SIGNIFICANT CHANGE UP (ref 32–37)
MCV RBC AUTO: 93.9 FL — SIGNIFICANT CHANGE UP (ref 80–94)
MONOCYTES # BLD AUTO: 0.97 K/UL — HIGH (ref 0.1–0.6)
MONOCYTES NFR BLD AUTO: 10.3 % — HIGH (ref 1.7–9.3)
NEUTROPHILS # BLD AUTO: 6.52 K/UL — HIGH (ref 1.4–6.5)
NEUTROPHILS NFR BLD AUTO: 69.3 % — SIGNIFICANT CHANGE UP (ref 42.2–75.2)
NON HDL CHOLESTEROL: 58 MG/DL — SIGNIFICANT CHANGE UP
NRBC # BLD: 0 /100 WBCS — SIGNIFICANT CHANGE UP (ref 0–0)
PLATELET # BLD AUTO: 269 K/UL — SIGNIFICANT CHANGE UP (ref 130–400)
PMV BLD: 9.6 FL — SIGNIFICANT CHANGE UP (ref 7.4–10.4)
POTASSIUM SERPL-MCNC: 4.1 MMOL/L — SIGNIFICANT CHANGE UP (ref 3.5–5)
POTASSIUM SERPL-SCNC: 4.1 MMOL/L — SIGNIFICANT CHANGE UP (ref 3.5–5)
PROT SERPL-MCNC: 5.7 G/DL — LOW (ref 6–8)
RBC # BLD: 3.58 M/UL — LOW (ref 4.7–6.1)
RBC # FLD: 12.3 % — SIGNIFICANT CHANGE UP (ref 11.5–14.5)
SODIUM SERPL-SCNC: 138 MMOL/L — SIGNIFICANT CHANGE UP (ref 135–146)
TRIGL SERPL-MCNC: 52 MG/DL — SIGNIFICANT CHANGE UP
VANCOMYCIN FLD-MCNC: 19.4 UG/ML — HIGH (ref 5–10)
WBC # BLD: 9.42 K/UL — SIGNIFICANT CHANGE UP (ref 4.8–10.8)
WBC # FLD AUTO: 9.42 K/UL — SIGNIFICANT CHANGE UP (ref 4.8–10.8)

## 2024-11-02 PROCEDURE — 20103 EXPL PENTRG WOUND EXTREMITY: CPT | Mod: RT

## 2024-11-02 PROCEDURE — 88311 DECALCIFY TISSUE: CPT | Mod: 26

## 2024-11-02 PROCEDURE — 99232 SBSQ HOSP IP/OBS MODERATE 35: CPT

## 2024-11-02 PROCEDURE — 88304 TISSUE EXAM BY PATHOLOGIST: CPT | Mod: 26

## 2024-11-02 RX ORDER — ONDANSETRON HYDROCHLORIDE 2 MG/ML
4 INJECTION, SOLUTION INTRAMUSCULAR; INTRAVENOUS ONCE
Refills: 0 | Status: DISCONTINUED | OUTPATIENT
Start: 2024-11-02 | End: 2024-11-04

## 2024-11-02 RX ORDER — HYDROMORPHONE HCL/0.9% NACL/PF 6 MG/30 ML
0.5 PATIENT CONTROLLED ANALGESIA SYRINGE INTRAVENOUS
Refills: 0 | Status: DISCONTINUED | OUTPATIENT
Start: 2024-11-02 | End: 2024-11-02

## 2024-11-02 RX ORDER — VANCOMYCIN HYDROCHLORIDE 50 MG/ML
1750 KIT ORAL EVERY 12 HOURS
Refills: 0 | Status: DISCONTINUED | OUTPATIENT
Start: 2024-11-02 | End: 2024-11-04

## 2024-11-02 RX ORDER — INSULIN GLARGINE,HUM.REC.ANLOG 100/ML
5 VIAL (ML) SUBCUTANEOUS AT BEDTIME
Refills: 0 | Status: DISCONTINUED | OUTPATIENT
Start: 2024-11-02 | End: 2024-11-06

## 2024-11-02 RX ORDER — GLUCAGON INJECTION, SOLUTION 1 MG/.2ML
1 INJECTION, SOLUTION SUBCUTANEOUS ONCE
Refills: 0 | Status: DISCONTINUED | OUTPATIENT
Start: 2024-11-02 | End: 2024-11-06

## 2024-11-02 RX ORDER — INSULIN LISPRO 100/ML
2 VIAL (ML) SUBCUTANEOUS
Refills: 0 | Status: DISCONTINUED | OUTPATIENT
Start: 2024-11-02 | End: 2024-11-06

## 2024-11-02 RX ORDER — POLYETHYLENE GLYCOL 3350 17 G/17G
17 POWDER, FOR SOLUTION ORAL DAILY
Refills: 0 | Status: DISCONTINUED | OUTPATIENT
Start: 2024-11-02 | End: 2024-11-06

## 2024-11-02 RX ORDER — MORPHINE SULFATE 30 MG/1
4 TABLET, EXTENDED RELEASE ORAL EVERY 4 HOURS
Refills: 0 | Status: DISCONTINUED | OUTPATIENT
Start: 2024-11-02 | End: 2024-11-06

## 2024-11-02 RX ORDER — INSULIN LISPRO 100/ML
VIAL (ML) SUBCUTANEOUS
Refills: 0 | Status: DISCONTINUED | OUTPATIENT
Start: 2024-11-02 | End: 2024-11-06

## 2024-11-02 RX ADMIN — BUPRENORPHINE HYDROCHLORIDE, NALOXONE HYDROCHLORIDE 1 TABLET(S): 12; 3 FILM, SOLUBLE BUCCAL; SUBLINGUAL at 22:01

## 2024-11-02 RX ADMIN — METRONIDAZOLE 100 MILLIGRAM(S): 250 TABLET ORAL at 03:07

## 2024-11-02 RX ADMIN — Medication 0.5 MILLIGRAM(S): at 13:15

## 2024-11-02 RX ADMIN — MORPHINE SULFATE 4 MILLIGRAM(S): 30 TABLET, EXTENDED RELEASE ORAL at 22:01

## 2024-11-02 RX ADMIN — Medication 130 MILLIGRAM(S): at 06:57

## 2024-11-02 RX ADMIN — Medication 100 MILLILITER(S): at 13:00

## 2024-11-02 RX ADMIN — Medication 130 MILLIGRAM(S): at 18:29

## 2024-11-02 RX ADMIN — METRONIDAZOLE 100 MILLIGRAM(S): 250 TABLET ORAL at 18:28

## 2024-11-02 RX ADMIN — Medication 75 MILLILITER(S): at 13:23

## 2024-11-02 RX ADMIN — Medication 650 MILLIGRAM(S): at 10:28

## 2024-11-02 RX ADMIN — METRONIDAZOLE 100 MILLIGRAM(S): 250 TABLET ORAL at 10:01

## 2024-11-02 RX ADMIN — Medication 650 MILLIGRAM(S): at 10:07

## 2024-11-02 RX ADMIN — Medication 5 UNIT(S): at 22:04

## 2024-11-02 RX ADMIN — CEFEPIME 100 MILLIGRAM(S): 2 INJECTION, POWDER, FOR SOLUTION INTRAVENOUS at 03:07

## 2024-11-02 RX ADMIN — Medication 0.5 MILLIGRAM(S): at 13:35

## 2024-11-02 RX ADMIN — BUPRENORPHINE HYDROCHLORIDE, NALOXONE HYDROCHLORIDE 1 TABLET(S): 12; 3 FILM, SOLUBLE BUCCAL; SUBLINGUAL at 06:58

## 2024-11-02 RX ADMIN — CEFEPIME 100 MILLIGRAM(S): 2 INJECTION, POWDER, FOR SOLUTION INTRAVENOUS at 10:00

## 2024-11-02 RX ADMIN — BUPRENORPHINE HYDROCHLORIDE, NALOXONE HYDROCHLORIDE 1 TABLET(S): 12; 3 FILM, SOLUBLE BUCCAL; SUBLINGUAL at 15:40

## 2024-11-02 RX ADMIN — MORPHINE SULFATE 4 MILLIGRAM(S): 30 TABLET, EXTENDED RELEASE ORAL at 22:00

## 2024-11-02 RX ADMIN — VANCOMYCIN HYDROCHLORIDE 300 MILLIGRAM(S): KIT at 13:22

## 2024-11-02 RX ADMIN — CEFEPIME 100 MILLIGRAM(S): 2 INJECTION, POWDER, FOR SOLUTION INTRAVENOUS at 18:28

## 2024-11-02 NOTE — PHARMACOTHERAPY INTERVENTION NOTE - COMMENTS
Reason for Consult: Physician Request    Recommended to adjust vancomycin dose from 1500 mg IV q12h to 1750 mg IV q12h since the vancomycin level today, 11/2 was 19.4 mg/L. As per PrecisePK calculations, current vancomycin AUC/LEVI is 397 mg/L*h, which is lower than the therapeutic range of 400 - 600 mg/L*h. Increasing the dose is predicted to yield an AUC/LEVI of 457 mg/L*h. Scr 0.7.      Nissa Saunders PharmD   Clinical Pharmacy Specialist, Infectious Diseases  Tele-[facility]tim[facility] Stewardship Program (Tele[facility])  [facility] Phone: [phone]   Reason for Consult: Physician Request    Recommended to adjust vancomycin dose from 1500 mg IV q12h to 1750 mg IV q12h since the vancomycin level today, 11/2 was 19.4 mg/L (not true trough taken 3h after infusion). As per PrecisePK calculations, current vancomycin AUC/LEVI is 397 mg/L*h, which is lower than the therapeutic range of 400 - 600 mg/L*h. Increasing the dose is predicted to yield an AUC/LEVI of 457 mg/L*h. Scr 0.7.      Tracey FrenchD   Clinical Pharmacy Specialist, Infectious Diseases  Tele-[facility]timicrobial Stewardship Program (Tele[facility])  Tele[facility] Phone: [phone]

## 2024-11-02 NOTE — PHARMACOTHERAPY INTERVENTION NOTE - COMMENTS
As per policy, ordered a vancomycin trough for 11/4 AM in order to assist with vancomycin pharmacokinetic monitoring.      Tracey FrenchD   Clinical Pharmacy Specialist, Infectious Diseases  Tele-[facility]timicrobial Stewardship Program (Tele[facility])  [facility] Phone: [phone]

## 2024-11-02 NOTE — CONSULT NOTE ADULT - SUBJECTIVE AND OBJECTIVE BOX
Podiatry Consult Note    Subjective:  IRIS QUEVEDO  Seen Bedside 53y Male  .   Patient is a 53y old  Male who presents with a chief complaint of Right Foot Abscess from puncture wound via nail - given IV ABX in ED, NPO since morning breakfast, requires urgent/semi-urgent Incision and Drainage in OR with washout. Seen bedside and placed on OR add-on schedule, subject to change.     HPI:      Past Medical History and Surgical History  PAST MEDICAL & SURGICAL HISTORY:  DVT (deep venous thrombosis)  5 yr- left  4 yrs- right  pe - 4 yrs      Pulmonary embolism      History of surgery  IVC FILTER           Review of Systems:  [X] Ten point review of systems is otherwise negative except as noted     Objective:  Vital Signs Last 24 Hrs  T(C): 36.8 (01 Nov 2024 09:52), Max: 36.8 (01 Nov 2024 09:52)  T(F): 98.2 (01 Nov 2024 09:52), Max: 98.2 (01 Nov 2024 09:52)  HR: 80 (01 Nov 2024 09:52) (80 - 80)  BP: 155/96 (01 Nov 2024 09:52) (155/96 - 155/96)  BP(mean): --  RR: 18 (01 Nov 2024 09:52) (18 - 18)  SpO2: 97% (01 Nov 2024 09:52) (97% - 97%)    Parameters below as of 01 Nov 2024 09:52  Patient On (Oxygen Delivery Method): room air                            12.5   13.25 )-----------( 255      ( 01 Nov 2024 10:40 )             37.7                 11-01    138  |  105  |  15  ----------------------------<  265[H]  4.5   |  24  |  0.8    Ca    8.5      01 Nov 2024 10:40    TPro  6.4  /  Alb  3.6  /  TBili  0.5  /  DBili  x   /  AST  13  /  ALT  13  /  AlkPhos  83  11-01        Physical Exam - Lower Extremity Focused:   Derm:   Right Foot - significant soft tissue swelling with underlying fluid, callous to plantar aspect of 1st met head, erythema and edema noted to forefoot significant suspicion for soft tissue abscess     Vascular: DP and PT Pulses Diminished; Foot is Warm to the touch; Capillary Refill Time < 3 Seconds;    Neuro: Protective Sensation Diminished / Moderately Neuropathic   MSK: Pain On Palpation at Wound Site     XR RIGHT FOOT  TECHNIQUE: 3 views of the right ankle and 3 views of the right foot  COMPARISON: None available.  FINDINGS:  Right ankle: No acute fracture or dislocation. Talar dome is intact. Ankle mortise is maintained. No appreciable joint effusion. No soft tissue swelling.  Right foot: Small plantar heel spur. No acute fracture. Tarsometatarsal alignment is maintained. No soft tissue gas seen.  IMPRESSION:  No acute bony abnormality.    Assessment:  Right Foot Abscess    Plan:  Chart reviewed and Patient evaluated. All Questions and Concerns Addressed and Answered  XR Imaging Right Foot; Reviewed Results  Patient to be scheduled as an OR add-on today   - Labs reviewed, T&S, ESR, CRP, A1C, recommended   - Patient to remain NPO for Sx intervention   - Patient will undergo Incision and Drainage with Washout in OR   Please allow for any medical clearance   Continue IV ABX - appreciate ID consult following OR cultures   Recommend; Lower Extremity Arterial Duplex B/L  Discussed Plan w/ Dr. Ivy, DPM    Podiatry 
  IRIS QUEVEDO  53y, Male  Allergy: No Known Allergies      CHIEF COMPLAINT:    LOS      HPI:  A 53 year old man KTH DVT/PE 10 years ago, s/p IVC which is still in place and not on AC, presented to the ED for right foot pain and swelling after stepping on a nail at work 2 days ago. Patient removed the nail but he started developing swelling, erythema, tenderness around the area of the right first metatarsus. Yesterday, he developed an episode of fever 101F so he went to urgent care and they referred him to the ED. He hasn't been following with a PCP and suspects he might have diabetes. No recent DTap given.    Vital Signs Last 24 Hrs  T(C): 36.8 (01 Nov 2024 09:52), Max: 36.8 (01 Nov 2024 09:52)  T(F): 98.2 (01 Nov 2024 09:52), Max: 98.2 (01 Nov 2024 09:52)  HR: 80 (01 Nov 2024 09:52) (80 - 80)  BP: 124/68 (01 Nov 2024 13:32) (124/68 - 155/96)  SpO2: 97% (01 Nov 2024 09:52) (97% - 97%) on RA    Labs: WBC 13.25K, Hb 12.5, INR 1.07, Lytes Nl, Crea 0.8, Glu 265, LFTs Nl, lactate 1.2  R foot/ankle xray: no acute bony changes    Podiatry cs in ED: plan for urgent I&D in OR. Art duplex of LLE, ESR/CRP, A1C    Admitted for R foot abscess with cellulitis. (01 Nov 2024 13:36)      INFECTIOUS DISEASE HISTORY:    PAST MEDICAL & SURGICAL HISTORY:  DVT (deep venous thrombosis)  5 yr- left  4 yrs- right  pe - 4 yrs      Pulmonary embolism      History of surgery  IVC FILTER          FAMILY HISTORY  Family history of brain cancer (Father)        SOCIAL HISTORY  Social History:        ROS  General: Denies rigors, nightsweats  HEENT: Denies headache, rhinorrhea, sore throat, eye pain  CV: Denies CP, palpitations  PULM: Denies wheezing, hemoptysis  GI: Denies hematemesis, hematochezia, melena  : Denies discharge, hematuria  MSK: Denies arthralgias, myalgias  SKIN: Denies rash, lesions  NEURO: Denies paresthesias, weakness  PSYCH: Denies depression, anxiety    VITALS:  T(F): 98.1, Max: 98.2 (11-01-24 @ 09:52)  HR: 81  BP: 148/80  RR: 18Vital Signs Last 24 Hrs  T(C): 36.7 (01 Nov 2024 19:52), Max: 36.8 (01 Nov 2024 09:52)  T(F): 98.1 (01 Nov 2024 19:52), Max: 98.2 (01 Nov 2024 09:52)  HR: 81 (01 Nov 2024 19:52) (74 - 81)  BP: 148/80 (01 Nov 2024 19:52) (124/68 - 155/96)  BP(mean): 102 (01 Nov 2024 19:52) (102 - 102)  RR: 18 (01 Nov 2024 19:52) (18 - 18)  SpO2: 98% (01 Nov 2024 19:52) (97% - 98%)    Parameters below as of 01 Nov 2024 15:38  Patient On (Oxygen Delivery Method): room air        PHYSICAL EXAM:  Gen: NAD, resting in bed  HEENT: Normocephalic, atraumatic  Neck: supple, no lymphadenopathy  CV: Regular rate & regular rhythm  Lungs: decreased BS at bases, no fremitus  Abdomen: Soft, BS present  Ext: Warm, well perfused  Neuro: non focal, awake  Skin: right hallux swollen/edematous/erythematous   Lines: no phlebitis    TESTS & MEASUREMENTS:                        12.5  13.25 )-----------( 255      ( 01 Nov 2024 10:40 )             37.7    11-01    138  |  105  |  15  ----------------------------<  265[H]  4.5   |  24  |  0.8    Ca    8.5      01 Nov 2024 10:40    TPro  6.4  /  Alb  3.6  /  TBili  0.5  /  DBili  x   /  AST  13  /  ALT  13  /  AlkPhos  83  11-01      LIVER FUNCTIONS - ( 01 Nov 2024 10:40 )  Alb: 3.6 g/dL / Pro: 6.4 g/dL / ALK PHOS: 83 U/L / ALT: 13 U/L / AST: 13 U/L / GGT: x          Urinalysis Basic - ( 01 Nov 2024 10:40 )    Color: x / Appearance: x / SG: x / pH: x  Gluc: 265 mg/dL / Ketone: x  / Bili: x / Urobili: x  Blood: x / Protein: x / Nitrite: x  Leuk Esterase: x / RBC: x / WBC x  Sq Epi: x / Non Sq Epi: x / Bacteria: x          Lactate, Blood: 1.2 mmol/L (11-01-24 @ 10:40)      INFECTIOUS DISEASES TESTING      RADIOLOGY & ADDITIONAL TESTS:  I have personally reviewed the last Chest xray  CXR      CT      CARDIOLOGY TESTING      MEDICATIONS  buprenorphine 8 mG/naloxone 2 mG SL  Tablet 1 SubLingual three times a day  cefepime   IVPB 2000 IV Intermittent every 8 hours  enoxaparin Injectable 130 SubCutaneous every 12 hours  influenza   Vaccine 0.5 IntraMuscular once  lactated ringers. 1000 IV Continuous <Continuous>  metroNIDAZOLE  IVPB 500 IV Intermittent every 8 hours  vancomycin  IVPB 1500 IV Intermittent every 12 hours      Weight  Weight (kg): 131.5 (11-01-24 @ 09:52)    ANTIBIOTICS:  cefepime   IVPB 2000 milliGRAM(s) IV Intermittent every 8 hours  metroNIDAZOLE  IVPB 500 milliGRAM(s) IV Intermittent every 8 hours  vancomycin  IVPB 1500 milliGRAM(s) IV Intermittent every 12 hours      ALLERGIES:  No Known Allergies

## 2024-11-02 NOTE — PRE-ANESTHESIA EVALUATION ADULT - NSANTHDIETYNSD_GEN_ALL_CORE
Saw patient today to f/u . She looks much better and has no complaints.     I found a LaPOST with DNR in patients papers that came in with her so she will not need a LaPOST.     Yes

## 2024-11-02 NOTE — CONSULT NOTE ADULT - ASSESSMENT
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 Patient removed the nail but he started developing swelling, erythema, tenderness around the area of the right first metatarsus. Yesterday, he developed an episode of fever 101F so he went to urgent care and they referred him to the ED. He hasn't been following with a PCP and suspects he might have diabetes. No recent DTap given.    IMPRESSION  #Right Foot Cellulitis/Abscess after stepping on nail  - Xray Foot AP + Lateral + Oblique, Right (11.01.24 @ 11:06): No acute bony abnormality.  - s/p OR debridement 11/2    Sedimentation Rate, Erythrocyte: 40 mm/Hr (11.01.24 @ 19:59)  C-Reactive Protein: 170.0 mg/L (11.01.24 @ 19:59)    #DM II - newly diagnosed    #Hx of DVT/PE s/p IVC filter  #Obesity BMI (kg/m2): 38.3  #Abx allergy: No Known Allergies    RECOMMENDATIONS  - penetrating trauma through show --s/p OR debridement today   - continue vancomycin 1500 mg q 12 hours   - continue cefepime 2g q 8 hours   - continue flagyl 500 mg q 8 hours -- change to PO   - follow-up OR Cx    Please call or message on [employer] Teams if with any questions.  Spectra [phone]

## 2024-11-02 NOTE — PRE-ANESTHESIA EVALUATION ADULT - NSATTENDATTESTRD_GEN_ALL_CORE
The patient has been re-examined and I agree with the above assessment or I updated with my findings. Additional Notes: Patient states pneumonia vaccine was not available Detail Level: Simple

## 2024-11-02 NOTE — PROGRESS NOTE ADULT - SUBJECTIVE AND OBJECTIVE BOX
SUBJECTIVE/OVERNIGHT EVENTS  Today is hospital day 1d. This morning patient was seen and examined at bedside, resting comfortably in bed. No acute or major events overnight.      Cutaneous abscess of right foot    Family history of brain cancer (Father)    Handoff    MEWS Score    DVT (deep venous thrombosis)    Pulmonary embolism    Foot abscess, right    History of surgery    PUNCTURE FROM NAIL ON FOOT. LEG WOUNDS    90+    SysAdmin_VisitLink        MEDICATIONS  STANDING MEDICATIONS  buprenorphine 8 mG/naloxone 2 mG SL  Tablet 1 Tablet(s) SubLingual three times a day  cefepime   IVPB 2000 milliGRAM(s) IV Intermittent every 8 hours  enoxaparin Injectable 130 milliGRAM(s) SubCutaneous every 12 hours  influenza   Vaccine 0.5 milliLiter(s) IntraMuscular once  lactated ringers. 1000 milliLiter(s) IV Continuous <Continuous>  metroNIDAZOLE  IVPB 500 milliGRAM(s) IV Intermittent every 8 hours  vancomycin  IVPB 1500 milliGRAM(s) IV Intermittent every 12 hours    PRN MEDICATIONS  acetaminophen     Tablet .. 650 milliGRAM(s) Oral every 6 hours PRN    VITALS  T(F): 98.4 (11-02-24 @ 10:24), Max: 98.4 (11-02-24 @ 10:24)  HR: 65 (11-02-24 @ 10:52) (65 - 81)  BP: 119/78 (11-02-24 @ 10:52) (113/71 - 148/80)  RR: 18 (11-02-24 @ 10:52) (18 - 18)  SpO2: 96% (11-02-24 @ 10:52) (94% - 98%)  POCT Blood Glucose.: 142 mg/dL (11-02-24 @ 10:55)  POCT Blood Glucose.: 164 mg/dL (11-02-24 @ 07:05)    PHYSICAL EXAM  GENERAL  ( x ) NAD     (  ) obtunded     (  ) lethargic     (  ) somnolent    HEART  Rate -->  ( x ) normal rate    (  ) bradycardic    (  ) tachycardic  Rhythm -->  (  ) regular    (  ) regularly irregular    (  ) irregularly irregular  Murmurs -->  (  ) normal s1/s2    (  ) systolic murmur    (  ) diastolic murmur    (  ) continuous murmur     (  ) S3 present    (  ) S4 present    LUNGS  ( x )Unlabored respirations     (  ) tachypnea  (  ) B/L air entry     (  ) decreased breath sounds in:  (location     )    (  ) no adventitious sound     (  ) crackles     (  ) wheezing      (  ) rhonchi      (specify location:       )  (  ) chest wall tenderness (specify location:       )    ABDOMEN  ( x ) Soft     (  ) tense   |   ( x ) nondistended     (  ) distended   |   (  ) +BS     (  ) hypoactive bowel sounds     (  ) hyperactive bowel sounds  ( x ) nontender     (  ) RUQ tenderness     (  ) RLQ tenderness     (  ) LLQ tenderness     (  ) epigastric tenderness     (  ) diffuse tenderness  (  ) Splenomegaly      (  ) Hepatomegaly      (  ) Jaundice     (  ) ecchymosis       NERVOUS SYSTEM  ( x ) A&Ox3     (  ) confused     (  ) lethargic  CN II-XII:     (  ) Intact     (  ) focal deficits  (Specify:     )   Upper extremities:     (  ) strength X/5     (  ) focal deficit (specify:    )  Lower extremities:     (  ) strength  X/5    (  ) focal deficit (specify:    )    SKIN  (  ) No rashes or lesions   ( x ) well circumscribed ulcerated lesion on the right lower extremity   (  ) maculopapular rash     (  ) pustules     (  ) vesicles     (  ) ulcer     (  ) ecchymosis     (specify location:     )      LABS             11.2   9.42  )-----------( 269      ( 11-02-24 @ 04:30 )             33.6     138  |  103  |  11  -------------------------<  148   11-02-24 @ 04:30  4.1  |  23  |  0.7    Ca      7.9     11-02-24 @ 04:30    TPro  5.7  /  Alb  3.2  /  TBili  0.4  /  DBili  x   /  AST  8   /  ALT  10  /  AlkPhos  63  /  GGT  x     11-02-24 @ 04:30    PT/INR - ( 11-01-24 @ 10:40 )   PT: 12.70 sec;   INR: 1.07 ratio  PTT - ( 11-01-24 @ 10:40 )  PTT:28.1 sec      Urinalysis Basic - ( 02 Nov 2024 04:30 )    Color: x / Appearance: x / SG: x / pH: x  Gluc: 148 mg/dL / Ketone: x  / Bili: x / Urobili: x   Blood: x / Protein: x / Nitrite: x   Leuk Esterase: x / RBC: x / WBC x   Sq Epi: x / Non Sq Epi: x / Bacteria: x          IMAGING

## 2024-11-02 NOTE — PROGRESS NOTE ADULT - ASSESSMENT
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 Patient removed the nail but he started developing swelling, erythema, tenderness around the area of the right first metatarsus. Yesterday, he developed an episode of fever 101F so he went to urgent care and they referred him to the ED. He hasn't been following with a PCP and suspects he might have diabetes. No recent DTap given.     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 Patient removed the nail but he started developing swelling, erythema, tenderness around the area of the right first metatarsus. Yesterday, he developed an episode of fever 101F so he went to urgent care and they referred him to the ED. He hasn't been following with a PCP and suspects he might have diabetes. No recent DTap given.     #Right foot abscess with cellulitis  - Hemodynamically stable, Sat 97% on RA, 101F at home  - WBC 13.25K, Hb 12.5, INR 1.07, Lytes Nl, Crea 0.8, Glu 265, LFTs Nl, lactate 1.2  - R foot/ankle xray: no acute bony changes  - Podiatry will perform debridement today; RCSI class 1 risk  - C/w cefepime 2g IV q8h, flagyl 500mg TID, vancomycin 1g IV q12h    #Bilateral DVTs  -Duplex showed findings concerning for bilateral DVT in the popliteal vein  -Patient was started on therapeutic lovenox    DVT ppx: lovenox  GI ppx: not indicated  AAT  Diet: NPO for now  Dispo: med

## 2024-11-03 LAB
ANION GAP SERPL CALC-SCNC: 9 MMOL/L — SIGNIFICANT CHANGE UP (ref 7–14)
BUN SERPL-MCNC: 12 MG/DL — SIGNIFICANT CHANGE UP (ref 10–20)
CALCIUM SERPL-MCNC: 7.9 MG/DL — LOW (ref 8.4–10.5)
CHLORIDE SERPL-SCNC: 103 MMOL/L — SIGNIFICANT CHANGE UP (ref 98–110)
CO2 SERPL-SCNC: 26 MMOL/L — SIGNIFICANT CHANGE UP (ref 17–32)
CREAT SERPL-MCNC: 0.8 MG/DL — SIGNIFICANT CHANGE UP (ref 0.7–1.5)
EGFR: 106 ML/MIN/1.73M2 — SIGNIFICANT CHANGE UP
GLUCOSE BLDC GLUCOMTR-MCNC: 132 MG/DL — HIGH (ref 70–99)
GLUCOSE BLDC GLUCOMTR-MCNC: 146 MG/DL — HIGH (ref 70–99)
GLUCOSE BLDC GLUCOMTR-MCNC: 152 MG/DL — HIGH (ref 70–99)
GLUCOSE SERPL-MCNC: 157 MG/DL — HIGH (ref 70–99)
HCT VFR BLD CALC: 34 % — LOW (ref 42–52)
HGB BLD-MCNC: 11.5 G/DL — LOW (ref 14–18)
MCHC RBC-ENTMCNC: 31.7 PG — HIGH (ref 27–31)
MCHC RBC-ENTMCNC: 33.8 G/DL — SIGNIFICANT CHANGE UP (ref 32–37)
MCV RBC AUTO: 93.7 FL — SIGNIFICANT CHANGE UP (ref 80–94)
NIGHT BLUE STAIN TISS: SIGNIFICANT CHANGE UP
NRBC # BLD: 0 /100 WBCS — SIGNIFICANT CHANGE UP (ref 0–0)
PLATELET # BLD AUTO: 294 K/UL — SIGNIFICANT CHANGE UP (ref 130–400)
PMV BLD: 9.7 FL — SIGNIFICANT CHANGE UP (ref 7.4–10.4)
POTASSIUM SERPL-MCNC: 3.9 MMOL/L — SIGNIFICANT CHANGE UP (ref 3.5–5)
POTASSIUM SERPL-SCNC: 3.9 MMOL/L — SIGNIFICANT CHANGE UP (ref 3.5–5)
RBC # BLD: 3.63 M/UL — LOW (ref 4.7–6.1)
RBC # FLD: 11.9 % — SIGNIFICANT CHANGE UP (ref 11.5–14.5)
SODIUM SERPL-SCNC: 138 MMOL/L — SIGNIFICANT CHANGE UP (ref 135–146)
SPECIMEN SOURCE: SIGNIFICANT CHANGE UP
WBC # BLD: 7.58 K/UL — SIGNIFICANT CHANGE UP (ref 4.8–10.8)
WBC # FLD AUTO: 7.58 K/UL — SIGNIFICANT CHANGE UP (ref 4.8–10.8)

## 2024-11-03 PROCEDURE — 99233 SBSQ HOSP IP/OBS HIGH 50: CPT

## 2024-11-03 RX ORDER — BUPRENORPHINE HYDROCHLORIDE, NALOXONE HYDROCHLORIDE 12; 3 MG/1; MG/1
1 FILM, SOLUBLE BUCCAL; SUBLINGUAL
Refills: 0 | Status: DISCONTINUED | OUTPATIENT
Start: 2024-11-03 | End: 2024-11-06

## 2024-11-03 RX ORDER — ACETAMINOPHEN 500 MG
975 TABLET ORAL EVERY 8 HOURS
Refills: 0 | Status: DISCONTINUED | OUTPATIENT
Start: 2024-11-03 | End: 2024-11-05

## 2024-11-03 RX ADMIN — METRONIDAZOLE 100 MILLIGRAM(S): 250 TABLET ORAL at 18:02

## 2024-11-03 RX ADMIN — METRONIDAZOLE 100 MILLIGRAM(S): 250 TABLET ORAL at 10:41

## 2024-11-03 RX ADMIN — Medication 2: at 07:44

## 2024-11-03 RX ADMIN — MORPHINE SULFATE 4 MILLIGRAM(S): 30 TABLET, EXTENDED RELEASE ORAL at 14:49

## 2024-11-03 RX ADMIN — Medication 5 UNIT(S): at 23:06

## 2024-11-03 RX ADMIN — BUPRENORPHINE HYDROCHLORIDE, NALOXONE HYDROCHLORIDE 1 TABLET(S): 12; 3 FILM, SOLUBLE BUCCAL; SUBLINGUAL at 06:02

## 2024-11-03 RX ADMIN — Medication 2 UNIT(S): at 12:12

## 2024-11-03 RX ADMIN — METRONIDAZOLE 100 MILLIGRAM(S): 250 TABLET ORAL at 02:15

## 2024-11-03 RX ADMIN — MORPHINE SULFATE 4 MILLIGRAM(S): 30 TABLET, EXTENDED RELEASE ORAL at 07:32

## 2024-11-03 RX ADMIN — MORPHINE SULFATE 4 MILLIGRAM(S): 30 TABLET, EXTENDED RELEASE ORAL at 23:45

## 2024-11-03 RX ADMIN — MORPHINE SULFATE 4 MILLIGRAM(S): 30 TABLET, EXTENDED RELEASE ORAL at 19:14

## 2024-11-03 RX ADMIN — Medication 130 MILLIGRAM(S): at 18:18

## 2024-11-03 RX ADMIN — MORPHINE SULFATE 4 MILLIGRAM(S): 30 TABLET, EXTENDED RELEASE ORAL at 04:36

## 2024-11-03 RX ADMIN — MORPHINE SULFATE 4 MILLIGRAM(S): 30 TABLET, EXTENDED RELEASE ORAL at 18:32

## 2024-11-03 RX ADMIN — MORPHINE SULFATE 4 MILLIGRAM(S): 30 TABLET, EXTENDED RELEASE ORAL at 02:14

## 2024-11-03 RX ADMIN — Medication 975 MILLIGRAM(S): at 22:45

## 2024-11-03 RX ADMIN — Medication 2 UNIT(S): at 07:43

## 2024-11-03 RX ADMIN — CEFEPIME 100 MILLIGRAM(S): 2 INJECTION, POWDER, FOR SOLUTION INTRAVENOUS at 18:01

## 2024-11-03 RX ADMIN — Medication 130 MILLIGRAM(S): at 06:03

## 2024-11-03 RX ADMIN — CEFEPIME 100 MILLIGRAM(S): 2 INJECTION, POWDER, FOR SOLUTION INTRAVENOUS at 02:15

## 2024-11-03 RX ADMIN — VANCOMYCIN HYDROCHLORIDE 250 MILLIGRAM(S): KIT at 15:42

## 2024-11-03 RX ADMIN — MORPHINE SULFATE 4 MILLIGRAM(S): 30 TABLET, EXTENDED RELEASE ORAL at 15:51

## 2024-11-03 RX ADMIN — Medication 975 MILLIGRAM(S): at 23:45

## 2024-11-03 RX ADMIN — VANCOMYCIN HYDROCHLORIDE 250 MILLIGRAM(S): KIT at 02:34

## 2024-11-03 RX ADMIN — CEFEPIME 100 MILLIGRAM(S): 2 INJECTION, POWDER, FOR SOLUTION INTRAVENOUS at 10:41

## 2024-11-03 RX ADMIN — MORPHINE SULFATE 4 MILLIGRAM(S): 30 TABLET, EXTENDED RELEASE ORAL at 22:45

## 2024-11-03 RX ADMIN — MORPHINE SULFATE 4 MILLIGRAM(S): 30 TABLET, EXTENDED RELEASE ORAL at 08:45

## 2024-11-03 NOTE — PHYSICAL THERAPY INITIAL EVALUATION ADULT - GAIT TRAINING, PT EVAL
will ambulate 150 ft x 2 with RW, modified independence by d/c; will negotiate 5 steps with 1 rail, CS by d/c

## 2024-11-03 NOTE — PROGRESS NOTE ADULT - SUBJECTIVE AND OBJECTIVE BOX
SUBJECTIVE/OVERNIGHT EVENTS  Today is hospital day 2d. This morning patient was seen and examined at bedside, resting comfortably in bed. No acute or major events overnight.    MEDICATIONS  STANDING MEDICATIONS  buprenorphine 8 mG/naloxone 2 mG SL  Tablet 1 Tablet(s) SubLingual three times a day  cefepime   IVPB 2000 milliGRAM(s) IV Intermittent every 8 hours  dextrose 5%. 1000 milliLiter(s) IV Continuous <Continuous>  dextrose 5%. 1000 milliLiter(s) IV Continuous <Continuous>  dextrose 50% Injectable 12.5 Gram(s) IV Push once  dextrose 50% Injectable 25 Gram(s) IV Push once  dextrose 50% Injectable 25 Gram(s) IV Push once  enoxaparin Injectable 130 milliGRAM(s) SubCutaneous every 12 hours  glucagon  Injectable 1 milliGRAM(s) IntraMuscular once  influenza   Vaccine 0.5 milliLiter(s) IntraMuscular once  insulin glargine Injectable (LANTUS) 5 Unit(s) SubCutaneous at bedtime  insulin lispro (ADMELOG) corrective regimen sliding scale   SubCutaneous three times a day before meals  insulin lispro Injectable (ADMELOG) 2 Unit(s) SubCutaneous three times a day before meals  metroNIDAZOLE  IVPB 500 milliGRAM(s) IV Intermittent every 8 hours  polyethylene glycol 3350 17 Gram(s) Oral daily  vancomycin  IVPB 1750 milliGRAM(s) IV Intermittent every 12 hours    PRN MEDICATIONS  acetaminophen     Tablet .. 650 milliGRAM(s) Oral every 6 hours PRN  dextrose Oral Gel 15 Gram(s) Oral once PRN  morphine  - Injectable 4 milliGRAM(s) IV Push every 4 hours PRN  ondansetron Injectable 4 milliGRAM(s) IV Push once PRN    VITALS  T(F): 97.8 (11-03-24 @ 12:40), Max: 98.4 (11-03-24 @ 04:42)  HR: 61 (11-03-24 @ 12:40) (61 - 70)  BP: 107/70 (11-03-24 @ 12:40) (107/70 - 121/70)  RR: 18 (11-03-24 @ 12:40) (18 - 18)  SpO2: --  POCT Blood Glucose.: 132 mg/dL (11-03-24 @ 11:34)  POCT Blood Glucose.: 152 mg/dL (11-03-24 @ 07:11)  POCT Blood Glucose.: 152 mg/dL (11-02-24 @ 21:40)    PHYSICAL EXAM  GENERAL: NAD, lying in bed comfortably  HEAD:  Atraumatic, normocephalic  EYES: EOMI, PERRLA, conjunctiva and sclera clear  NECK: Supple, trachea midline, no JVD  HEART: Regular rate and rhythm, no murmurs, rubs, or gallops  LUNGS: Unlabored respirations.  Clear to auscultation bilaterally, no crackles, wheezing, or rhonchi  ABDOMEN: Soft, nontender, nondistended, +BS  EXTREMITIES: 2+ peripheral pulses bilaterally. Right foot swelling, erythema, tenderness around 1st metatarsus suspicious of abscess  NERVOUS SYSTEM:  A&Ox3, moving all extremities, no focal deficits   SKIN: Bilat stasis dermatitis, left anterior shin venous ulcers      LABS             11.5   7.58  )-----------( 294      ( 11-03-24 @ 07:43 )             34.0     138  |  103  |  12  -------------------------<  157   11-03-24 @ 07:43  3.9  |  26  |  0.8    Ca      7.9     11-03-24 @ 07:43    TPro  5.7  /  Alb  3.2  /  TBili  0.4  /  DBili  x   /  AST  8   /  ALT  10  /  AlkPhos  63  /  GGT  x     11-02-24 @ 04:30        Urinalysis Basic - ( 03 Nov 2024 07:43 )    Color: x / Appearance: x / SG: x / pH: x  Gluc: 157 mg/dL / Ketone: x  / Bili: x / Urobili: x   Blood: x / Protein: x / Nitrite: x   Leuk Esterase: x / RBC: x / WBC x   Sq Epi: x / Non Sq Epi: x / Bacteria: x          Culture - Acid Fast - Other w/Smear (collected 02 Nov 2024 11:37)  Source: .Other    Culture - Blood (collected 01 Nov 2024 10:40)  Source: .Blood BLOOD  Preliminary Report (02 Nov 2024 16:00):    No growth at 24 hours    Culture - Blood (collected 01 Nov 2024 10:40)  Source: .Blood BLOOD  Preliminary Report (02 Nov 2024 16:00):    No growth at 24 hours      IMAGING

## 2024-11-03 NOTE — PROGRESS NOTE ADULT - SUBJECTIVE AND OBJECTIVE BOX
Podiatry Progress Note    Subjective:  IRIS QUEVEDO is a  53y Male.   Seen bedside s/p Excision and Debridement with Incision and Drainage and Washout, Right Foot POD 1.   Patient is a 53y old  Male who presents with a chief complaint of Toe Abscess (02 Nov 2024 15:30)      Past Medical History and Surgical History  PAST MEDICAL & SURGICAL HISTORY:  DVT (deep venous thrombosis)  5 yr- left  4 yrs- right  pe - 4 yrs      Pulmonary embolism      History of surgery  IVC FILTER           Objective:  Vital Signs Last 24 Hrs  T(C): 36.9 (03 Nov 2024 04:42), Max: 36.9 (03 Nov 2024 04:42)  T(F): 98.4 (03 Nov 2024 04:42), Max: 98.4 (03 Nov 2024 04:42)  HR: 70 (03 Nov 2024 04:42) (56 - 78)  BP: 121/70 (03 Nov 2024 04:42) (114/74 - 144/83)  BP(mean): 87 (03 Nov 2024 04:42) (87 - 87)  RR: 18 (03 Nov 2024 04:42) (12 - 21)  SpO2: 100% (02 Nov 2024 13:45) (96% - 100%)    Parameters below as of 02 Nov 2024 12:40  Patient On (Oxygen Delivery Method): room air                            11.5   7.58  )-----------( 294      ( 03 Nov 2024 07:43 )             34.0                 11-03    138  |  103  |  12  ----------------------------<  157[H]  3.9   |  26  |  0.8    Ca    7.9[L]      03 Nov 2024 07:43    TPro  5.7[L]  /  Alb  3.2[L]  /  TBili  0.4  /  DBili  x   /  AST  8   /  ALT  10  /  AlkPhos  63  11-02        Physical Exam - Lower Extremity Focused:   Derm:   Right Foot - open wound to plantar aspect of Right foot, mild bleeding draining from wound, no purulence, no malodor  - Improvement noted to erythema to right forefoot and hallux, erythema still present to base of hallux, sutures and internal packing intact   - Soft tissue swelling vs. secondary fluid collection to plantar aspect of distal hallux   Vascular: DP and PT Pulses Diminished; Foot is Warm to the touch; Capillary Refill Time < 3 Seconds;    Neuro: Protective Sensation Diminished / Moderately Neuropathic   MSK: Pain On Palpation at Wound Site     Assessment:  s/p Excision and Debridement with Incision and Drainage and Washout, Right Foot 11/2   POD 1    Plan:  Chart reviewed and Patient evaluated. All Questions and Concerns Addressed and Answered  Local Wound Care: Applied iodoform packing, xeroform, gauze, abd pad, kerlix, ACE bandage   Weight Bearing Status; WBAT w/ Surgical Shoe  Continue w/ Local Wound Care; Q24 Dressing Changes  Podiatry to see patient tomorrow for further evaluation  Continue IV ABX as per ID recommendations    Discussed Plan w/ Attending Dr. Ivy, DPM     Podiatry   Please message on teams for any further questions

## 2024-11-03 NOTE — PHYSICAL THERAPY INITIAL EVALUATION ADULT - GENERAL OBSERVATIONS, REHAB EVAL
52 y/o M rec'd/left in bed, nad, RT foot drsg. pt is A+Ox4, pleasant and following VC's. pt reported no pain during mobility. ambulated 60 ft with RW, bilat [place] shoes, WBAT bilat LE's.

## 2024-11-03 NOTE — PROGRESS NOTE ADULT - ASSESSMENT
A 53 year old man PMH of diabetes mellitus, DVT/PE 10 years ago, s/p IVC which is still in place and not on AC, presented to the ED for right foot pain and swelling after stepping on a nail at work 2 days ago. Patient removed the nail but he started developing swelling, erythema, tenderness around the area of the right first metatarsus. The day before admission, he developed an episode of fever 101F so he went to urgent care and they referred him to the ED. He hasn't been following with a PCP and suspects he might have diabetes. No recent DTap given.    #Right foot abscess with cellulitis s/p dbx pod1   Podiatry following,    Infectious disease following  Pain regimen ordered  Wound cultures 11/2 pending  - C/w cefepime 2g IV q8h, flagyl 500mg TID, vancomycin 1g IV q12h    #Bilateral DVTs  -Duplex showed findings concerning for bilateral DVT in the popliteal vein  -Patient was started on therapeutic Lovenox  Likely needs anticoagulation on discharge    #Diabetes mellitus  Hb A1C 7.3  Lantus 5 units QHS  Lispro 2 units AC  Lispro sliding insulin scale    DVT ppx: therapeutic lovenox  AAT  Diet: diabetic diet  Dispo: pending cultures.  Will need evaluation by PT.       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 Patient removed the nail but he started developing swelling, erythema, tenderness around the area of the right first metatarsus. Yesterday, he developed an episode of fever 101F so he went to urgent care and they referred him to the ED. He hasn't been following with a PCP and suspects he might have diabetes. No recent DTap given.     #Right foot abscess with cellulitis  - Hemodynamically stable, Sat 97% on RA, 101F at home  - WBC 13.25K, Hb 12.5, INR 1.07, Lytes Nl, Crea 0.8, Glu 265, LFTs Nl, lactate 1.2  - R foot/ankle xray: no acute bony changes  - Podiatry will perform debridement today; RCSI class 1 risk  - C/w cefepime 2g IV q8h, flagyl 500mg TID, vancomycin 1g IV q12h    #Bilateral DVTs  -Duplex showed findings concerning for bilateral DVT in the popliteal vein  -Patient was started on therapeutic lovenox    DVT ppx: lovenox  GI ppx: not indicated  AAT  Diet: NPO for now  Dispo: med

## 2024-11-03 NOTE — PROGRESS NOTE ADULT - SUBJECTIVE AND OBJECTIVE BOX
SUBJECTIVE:    Patient is a 53y old Male who presents with a chief complaint of Toe Abscess (02 Nov 2024 15:30)      Today is hospital day 2d.     Overnight Events:     No acute overnight events. No active complaints    PAST MEDICAL & SURGICAL HISTORY  DVT (deep venous thrombosis)  5 yr- left  4 yrs- right  pe - 4 yrs    Pulmonary embolism    History of surgery  IVC FILTER          ALLERGIES:  No Known Allergies    MEDICATIONS:  STANDING MEDICATIONS  buprenorphine 8 mG/naloxone 2 mG SL  Tablet 1 Tablet(s) SubLingual three times a day  cefepime   IVPB 2000 milliGRAM(s) IV Intermittent every 8 hours  dextrose 5%. 1000 milliLiter(s) IV Continuous <Continuous>  dextrose 5%. 1000 milliLiter(s) IV Continuous <Continuous>  dextrose 50% Injectable 25 Gram(s) IV Push once  dextrose 50% Injectable 12.5 Gram(s) IV Push once  dextrose 50% Injectable 25 Gram(s) IV Push once  enoxaparin Injectable 130 milliGRAM(s) SubCutaneous every 12 hours  glucagon  Injectable 1 milliGRAM(s) IntraMuscular once  influenza   Vaccine 0.5 milliLiter(s) IntraMuscular once  insulin glargine Injectable (LANTUS) 5 Unit(s) SubCutaneous at bedtime  insulin lispro (ADMELOG) corrective regimen sliding scale   SubCutaneous three times a day before meals  insulin lispro Injectable (ADMELOG) 2 Unit(s) SubCutaneous three times a day before meals  lactated ringers. 1000 milliLiter(s) IV Continuous <Continuous>  lactated ringers. 1000 milliLiter(s) IV Continuous <Continuous>  metroNIDAZOLE  IVPB 500 milliGRAM(s) IV Intermittent every 8 hours  polyethylene glycol 3350 17 Gram(s) Oral daily  vancomycin  IVPB 1750 milliGRAM(s) IV Intermittent every 12 hours    PRN MEDICATIONS  acetaminophen     Tablet .. 650 milliGRAM(s) Oral every 6 hours PRN  dextrose Oral Gel 15 Gram(s) Oral once PRN  morphine  - Injectable 4 milliGRAM(s) IV Push every 4 hours PRN  ondansetron Injectable 4 milliGRAM(s) IV Push once PRN    VITALS:   ICU Vital Signs Last 24 Hrs  T(C): 36.9 (03 Nov 2024 04:42), Max: 36.9 (02 Nov 2024 10:24)  T(F): 98.4 (03 Nov 2024 04:42), Max: 98.4 (02 Nov 2024 10:24)  HR: 70 (03 Nov 2024 04:42) (56 - 78)  BP: 121/70 (03 Nov 2024 04:42) (113/71 - 144/83)  BP(mean): 87 (03 Nov 2024 04:42) (87 - 102)  ABP: --  ABP(mean): --  RR: 18 (03 Nov 2024 04:42) (12 - 21)  SpO2: 100% (02 Nov 2024 13:45) (94% - 100%)    O2 Parameters below as of 02 Nov 2024 12:40  Patient On (Oxygen Delivery Method): room air            LABS:                        11.5   7.58  )-----------( 294      ( 03 Nov 2024 07:43 )             34.0     11-03    138  |  103  |  12  ----------------------------<  157[H]  3.9   |  26  |  0.8    Ca    7.9[L]      03 Nov 2024 07:43    TPro  5.7[L]  /  Alb  3.2[L]  /  TBili  0.4  /  DBili  x   /  AST  8   /  ALT  10  /  AlkPhos  63  11-02    PT/INR - ( 01 Nov 2024 10:40 )   PT: 12.70 sec;   INR: 1.07 ratio         PTT - ( 01 Nov 2024 10:40 )  PTT:28.1 sec   Urinalysis Basic - ( 03 Nov 2024 07:43 )    Color: x / Appearance: x / SG: x / pH: x  Gluc: 157 mg/dL / Ketone: x  / Bili: x / Urobili: x   Blood: x / Protein: x / Nitrite: x   Leuk Esterase: x / RBC: x / WBC x   Sq Epi: x / Non Sq Epi: x / Bacteria: x            Culture - Blood (collected 01 Nov 2024 10:40)  Source: .Blood BLOOD  Preliminary Report (02 Nov 2024 16:00):    No growth at 24 hours    Culture - Blood (collected 01 Nov 2024 10:40)  Source: .Blood BLOOD  Preliminary Report (02 Nov 2024 16:00):    No growth at 24 hours          Cardiology:            RADIOLOGY:        Lines:  Central line:              Date placed:             Indication:   Intravenous Access:   NG tube:   Quinn Catheter:       Diagnositic orders     vancomycin  IVPB (11-02-24 @ 21:54) [Active]  Consult- PT Evaluate and Treat (11-02-24 @ 19:54) [Active]  Diet, Consistent Carbohydrate w/Evening Snack (11-02-24 @ 19:53) [Active]  Glucose (11-02-24 @ 19:48) [Available for Activation]  glucagon  Injectable (11-02-24 @ 19:48) [Available for Activation]  dextrose 5%. (11-02-24 @ 19:48) [Available for Activation]  dextrose 50% Injectable (11-02-24 @ 19:48) [Available for Activation]  dextrose 50% Injectable (11-02-24 @ 19:48) [Available for Activation]  dextrose 50% Injectable (11-02-24 @ 19:48) [Available for Activation]  dextrose 5%. (11-02-24 @ 19:48) [Available for Activation]  Administer Carbohydrates (11-02-24 @ 19:48) [Active]  dextrose Oral Gel (11-02-24 @ 19:48) [Available for Activation]  Provider to RN (11-02-24 @ 19:48) [Active]  Provider to RN (11-02-24 @ 19:48) [Active]  Provider to RN (11-02-24 @ 19:48) [Active]  insulin lispro (ADMELOG) corrective regimen sliding scale (11-02-24 @ 19:48) [Active]  insulin lispro Injectable (ADMELOG) (11-02-24 @ 19:48) [Active]  insulin glargine Injectable (LANTUS) (11-02-24 @ 19:48) [Active]  Education (11-02-24 @ 19:48) [Active]  Notify Provider For (11-02-24 @ 19:48) [Active]  Blood Glucose Point Of Care Testing (11-02-24 @ 19:48) [Active]  Blood Glucose Point Of Care Testing (11-02-24 @ 19:48) [Active]  Blood Glucose Point Of Care Testing (11-02-24 @ 19:48) [Active]  polyethylene glycol 3350 (11-02-24 @ 14:42) [Active]  morphine  - Injectable (11-02-24 @ 14:42) [Active]  Surgical Shoe (11-02-24 @ 12:32) [Active]  Activity - Weight Bearing Status (11-02-24 @ 12:32) [Active]  ondansetron Injectable (11-02-24 @ 10:56) [Active]  lactated ringers. (11-02-24 @ 10:56) [Active]  Oxygen Delivery Therapy (11-02-24 @ 10:56) [Active]  Pulse Oximetry (11-02-24 @ 10:56) [Active]  Cardiac Monitor Bedside (11-02-24 @ 10:56) [Active]  Notify Provider For (11-02-24 @ 10:56) [Active]  Assess Pain (11-02-24 @ 10:56) [Active]  Vital Signs (11-02-24 @ 10:56) [Active]  Monitor For (11-02-24 @ 10:56) [Active]  Active Patient Warming (11-02-24 @ 10:56) [Active]  Assess for Hypothermia and Hyperthermia (11-02-24 @ 10:56) [Active]  Transfer in Level of Care and or Service (11-02-24 @ 10:56) [Active]     SUBJECTIVE:    Patient is a 53y old Male who presents with a chief complaint of Toe Abscess (02 Nov 2024 15:30)      Today is hospital day 2d.     Overnight Events:     still in pain     PAST MEDICAL & SURGICAL HISTORY  DVT (deep venous thrombosis)  5 yr- left  4 yrs- right  pe - 4 yrs    Pulmonary embolism    History of surgery  IVC FILTER          ALLERGIES:  No Known Allergies    MEDICATIONS:  STANDING MEDICATIONS  buprenorphine 8 mG/naloxone 2 mG SL  Tablet 1 Tablet(s) SubLingual three times a day  cefepime   IVPB 2000 milliGRAM(s) IV Intermittent every 8 hours  dextrose 5%. 1000 milliLiter(s) IV Continuous <Continuous>  dextrose 5%. 1000 milliLiter(s) IV Continuous <Continuous>  dextrose 50% Injectable 25 Gram(s) IV Push once  dextrose 50% Injectable 12.5 Gram(s) IV Push once  dextrose 50% Injectable 25 Gram(s) IV Push once  enoxaparin Injectable 130 milliGRAM(s) SubCutaneous every 12 hours  glucagon  Injectable 1 milliGRAM(s) IntraMuscular once  influenza   Vaccine 0.5 milliLiter(s) IntraMuscular once  insulin glargine Injectable (LANTUS) 5 Unit(s) SubCutaneous at bedtime  insulin lispro (ADMELOG) corrective regimen sliding scale   SubCutaneous three times a day before meals  insulin lispro Injectable (ADMELOG) 2 Unit(s) SubCutaneous three times a day before meals  lactated ringers. 1000 milliLiter(s) IV Continuous <Continuous>  lactated ringers. 1000 milliLiter(s) IV Continuous <Continuous>  metroNIDAZOLE  IVPB 500 milliGRAM(s) IV Intermittent every 8 hours  polyethylene glycol 3350 17 Gram(s) Oral daily  vancomycin  IVPB 1750 milliGRAM(s) IV Intermittent every 12 hours    PRN MEDICATIONS  acetaminophen     Tablet .. 650 milliGRAM(s) Oral every 6 hours PRN  dextrose Oral Gel 15 Gram(s) Oral once PRN  morphine  - Injectable 4 milliGRAM(s) IV Push every 4 hours PRN  ondansetron Injectable 4 milliGRAM(s) IV Push once PRN    VITALS:   ICU Vital Signs Last 24 Hrs  T(C): 36.9 (03 Nov 2024 04:42), Max: 36.9 (02 Nov 2024 10:24)  T(F): 98.4 (03 Nov 2024 04:42), Max: 98.4 (02 Nov 2024 10:24)  HR: 70 (03 Nov 2024 04:42) (56 - 78)  BP: 121/70 (03 Nov 2024 04:42) (113/71 - 144/83)  BP(mean): 87 (03 Nov 2024 04:42) (87 - 102)  RR: 18 (03 Nov 2024 04:42) (12 - 21)  SpO2: 100% (02 Nov 2024 13:45) (94% - 100%)    O2 Parameters below as of 02 Nov 2024 12:40  Patient On (Oxygen Delivery Method): room air      LABS:                        11.5   7.58  )-----------( 294      ( 03 Nov 2024 07:43 )             34.0     11-03    138  |  103  |  12  ----------------------------<  157[H]  3.9   |  26  |  0.8    Ca    7.9[L]      03 Nov 2024 07:43    TPro  5.7[L]  /  Alb  3.2[L]  /  TBili  0.4  /  DBili  x   /  AST  8   /  ALT  10  /  AlkPhos  63  11-02    PT/INR - ( 01 Nov 2024 10:40 )   PT: 12.70 sec;   INR: 1.07 ratio         PTT - ( 01 Nov 2024 10:40 )  PTT:28.1 sec   Urinalysis Basic - ( 03 Nov 2024 07:43 )    Color: x / Appearance: x / SG: x / pH: x  Gluc: 157 mg/dL / Ketone: x  / Bili: x / Urobili: x   Blood: x / Protein: x / Nitrite: x   Leuk Esterase: x / RBC: x / WBC x   Sq Epi: x / Non Sq Epi: x / Bacteria: x      Culture - Blood (collected 01 Nov 2024 10:40)  Source: .Blood BLOOD  Preliminary Report (02 Nov 2024 16:00):    No growth at 24 hours    Culture - Blood (collected 01 Nov 2024 10:40)  Source: .Blood BLOOD  Preliminary Report (02 Nov 2024 16:00):    No growth at 24 hours        Cardiology:            RADIOLOGY:        Lines:  Central line:              Date placed:             Indication:   Intravenous Access:   NG tube:   Quinn Catheter:       Diagnositic orders     vancomycin  IVPB (11-02-24 @ 21:54) [Active]  Consult- PT Evaluate and Treat (11-02-24 @ 19:54) [Active]  Diet, Consistent Carbohydrate w/Evening Snack (11-02-24 @ 19:53) [Active]  Glucose (11-02-24 @ 19:48) [Available for Activation]  glucagon  Injectable (11-02-24 @ 19:48) [Available for Activation]  dextrose 5%. (11-02-24 @ 19:48) [Available for Activation]  dextrose 50% Injectable (11-02-24 @ 19:48) [Available for Activation]  dextrose 50% Injectable (11-02-24 @ 19:48) [Available for Activation]  dextrose 50% Injectable (11-02-24 @ 19:48) [Available for Activation]  dextrose 5%. (11-02-24 @ 19:48) [Available for Activation]  Administer Carbohydrates (11-02-24 @ 19:48) [Active]  dextrose Oral Gel (11-02-24 @ 19:48) [Available for Activation]  Provider to RN (11-02-24 @ 19:48) [Active]  Provider to RN (11-02-24 @ 19:48) [Active]  Provider to RN (11-02-24 @ 19:48) [Active]  insulin lispro (ADMELOG) corrective regimen sliding scale (11-02-24 @ 19:48) [Active]  insulin lispro Injectable (ADMELOG) (11-02-24 @ 19:48) [Active]  insulin glargine Injectable (LANTUS) (11-02-24 @ 19:48) [Active]  Education (11-02-24 @ 19:48) [Active]  Notify Provider For (11-02-24 @ 19:48) [Active]  Blood Glucose Point Of Care Testing (11-02-24 @ 19:48) [Active]  Blood Glucose Point Of Care Testing (11-02-24 @ 19:48) [Active]  Blood Glucose Point Of Care Testing (11-02-24 @ 19:48) [Active]  polyethylene glycol 3350 (11-02-24 @ 14:42) [Active]  morphine  - Injectable (11-02-24 @ 14:42) [Active]  Surgical Shoe (11-02-24 @ 12:32) [Active]  Activity - Weight Bearing Status (11-02-24 @ 12:32) [Active]  ondansetron Injectable (11-02-24 @ 10:56) [Active]  lactated ringers. (11-02-24 @ 10:56) [Active]  Oxygen Delivery Therapy (11-02-24 @ 10:56) [Active]  Pulse Oximetry (11-02-24 @ 10:56) [Active]  Cardiac Monitor Bedside (11-02-24 @ 10:56) [Active]  Notify Provider For (11-02-24 @ 10:56) [Active]  Assess Pain (11-02-24 @ 10:56) [Active]  Vital Signs (11-02-24 @ 10:56) [Active]  Monitor For (11-02-24 @ 10:56) [Active]  Active Patient Warming (11-02-24 @ 10:56) [Active]  Assess for Hypothermia and Hyperthermia (11-02-24 @ 10:56) [Active]  Transfer in Level of Care and or Service (11-02-24 @ 10:56) [Active]

## 2024-11-03 NOTE — PROGRESS NOTE ADULT - ASSESSMENT
A 53 year old man PMH of diabetes mellitus, DVT/PE 10 years ago, s/p IVC which is still in place and not on AC, presented to the ED for right foot pain and swelling after stepping on a nail at work 2 days ago. Patient removed the nail but he started developing swelling, erythema, tenderness around the area of the right first metatarsus. The day before admission, he developed an episode of fever 101F so he went to urgent care and they referred him to the ED. He hasn't been following with a PCP and suspects he might have diabetes. No recent DTap given.    #Right foot abscess with cellulitis s/p dbx pod1   Podiatry following,    Infectious disease following  Pain regimen ordered  Wound cultures 11/2 pending  - C/w cefepime 2g IV q8h, flagyl 500mg TID, vancomycin 1g IV q12h    #Bilateral DVTs  -Duplex showed findings concerning for bilateral DVT in the popliteal vein  -Patient was started on therapeutic Lovenox  Likely needs anticoagulation on discharge    #Diabetes mellitus  Hb A1C 7.3  Lantus 5 units QHS  Lispro 2 units AC  Lispro sliding insulin scale    DVT ppx: therapeutic lovenox  AAT  Diet: diabetic diet  Dispo: pending cultures.  Will need evaluation by PT.      Time-based billing (NON-critical care).     39 minutes spent on total encounter. The necessity of the time spent during the encounter on this date of service was due to:     Direct patient care, interdisciplinary rounds. A 53 year old man PMH of diabetes mellitus, DVT/PE 10 years ago, s/p IVC which is still in place and not on AC, presented to the ED for right foot pain and swelling after stepping on a nail at work 2 days ago. Patient removed the nail but he started developing swelling, erythema, tenderness around the area of the right first metatarsus. The day before admission, he developed an episode of fever 101F so he went to urgent care and they referred him to the ED. He hasn't been following with a PCP and suspects he might have diabetes. No recent DTap given.    GENERAL: NAD, lying in bed comfortably  CHEST/LUNG: Clear to auscultation bilaterally; No rales, rhonchi, wheezing, or rubs. Unlabored respirations  HEART: Regular rate and rhythm; No murmurs, rubs, or gallops  ABDOMEN: Bowel sounds present; Soft, Nontender, Nondistended. No hepatomegally  EXTREMITIES:  right foot bandaged  NERVOUS SYSTEM:  Alert & Oriented X3, speech clear. No deficits        #Right foot abscess with cellulitis s/p dbx pod1   Podiatry following,    Infectious disease following  Pain regimen - dec suboxone dose . keep morphine as ordered. may need to increase as pain is still significant. add tylenol   Wound cultures 11/2 pending  - C/w cefepime 2g IV q8h, flagyl 500mg TID, vancomycin 1g IV q12h    #Bilateral DVTs  -Duplex showed findings concerning for bilateral DVT in the popliteal vein  -Patient was started on therapeutic Lovenox  Likely needs anticoagulation on discharge    #Diabetes mellitus  Hb A1C 7.3  Lantus 5 units QHS  Lispro 2 units AC  Lispro sliding insulin scale    DVT ppx: therapeutic lovenox  AAT  Diet: diabetic diet  Dispo: pending cultures.  Will need evaluation by PT.      Time-based billing (NON-critical care).     50 minutes spent on total encounter. The necessity of the time spent during the encounter on this date of service was due to:     Direct patient care, interdisciplinary rounds.

## 2024-11-03 NOTE — PHYSICAL THERAPY INITIAL EVALUATION ADULT - PERTINENT HX OF CURRENT PROBLEM, REHAB EVAL
s/p Excision and Debridement with Incision and Drainage and Washout, Right Foot POD 1.   Patient is a 53y old  Male who presents with a chief complaint of Toe Abscess (  Right Foot - open wound to plantar aspect of Right foot, mild bleeding draining from wound, no purulence, no malodor.  Weight Bearing Status; WBAT w/ Surgical Shoe

## 2024-11-04 LAB
-  CLINDAMYCIN: SIGNIFICANT CHANGE UP
-  ERYTHROMYCIN: SIGNIFICANT CHANGE UP
-  GENTAMICIN: SIGNIFICANT CHANGE UP
-  OXACILLIN: SIGNIFICANT CHANGE UP
-  RIFAMPIN: SIGNIFICANT CHANGE UP
-  TETRACYCLINE: SIGNIFICANT CHANGE UP
-  TRIMETHOPRIM/SULFAMETHOXAZOLE: SIGNIFICANT CHANGE UP
-  VANCOMYCIN: SIGNIFICANT CHANGE UP
ALBUMIN SERPL ELPH-MCNC: 3.5 G/DL — SIGNIFICANT CHANGE UP (ref 3.5–5.2)
ALP SERPL-CCNC: 68 U/L — SIGNIFICANT CHANGE UP (ref 30–115)
ALT FLD-CCNC: 13 U/L — SIGNIFICANT CHANGE UP (ref 0–41)
ANION GAP SERPL CALC-SCNC: 9 MMOL/L — SIGNIFICANT CHANGE UP (ref 7–14)
AST SERPL-CCNC: 16 U/L — SIGNIFICANT CHANGE UP (ref 0–41)
BASOPHILS # BLD AUTO: 0.02 K/UL — SIGNIFICANT CHANGE UP (ref 0–0.2)
BASOPHILS NFR BLD AUTO: 0.4 % — SIGNIFICANT CHANGE UP (ref 0–1)
BILIRUB SERPL-MCNC: 0.2 MG/DL — SIGNIFICANT CHANGE UP (ref 0.2–1.2)
BUN SERPL-MCNC: 15 MG/DL — SIGNIFICANT CHANGE UP (ref 10–20)
CALCIUM SERPL-MCNC: 8.6 MG/DL — SIGNIFICANT CHANGE UP (ref 8.4–10.5)
CHLORIDE SERPL-SCNC: 102 MMOL/L — SIGNIFICANT CHANGE UP (ref 98–110)
CO2 SERPL-SCNC: 28 MMOL/L — SIGNIFICANT CHANGE UP (ref 17–32)
CREAT SERPL-MCNC: 0.7 MG/DL — SIGNIFICANT CHANGE UP (ref 0.7–1.5)
EGFR: 110 ML/MIN/1.73M2 — SIGNIFICANT CHANGE UP
EOSINOPHIL # BLD AUTO: 0.22 K/UL — SIGNIFICANT CHANGE UP (ref 0–0.7)
EOSINOPHIL NFR BLD AUTO: 4.9 % — SIGNIFICANT CHANGE UP (ref 0–8)
GLUCOSE BLDC GLUCOMTR-MCNC: 128 MG/DL — HIGH (ref 70–99)
GLUCOSE BLDC GLUCOMTR-MCNC: 140 MG/DL — HIGH (ref 70–99)
GLUCOSE BLDC GLUCOMTR-MCNC: 181 MG/DL — HIGH (ref 70–99)
GLUCOSE BLDC GLUCOMTR-MCNC: 244 MG/DL — HIGH (ref 70–99)
GLUCOSE SERPL-MCNC: 203 MG/DL — HIGH (ref 70–99)
HCT VFR BLD CALC: 38.1 % — LOW (ref 42–52)
HGB BLD-MCNC: 12.6 G/DL — LOW (ref 14–18)
IMM GRANULOCYTES NFR BLD AUTO: 0.4 % — HIGH (ref 0.1–0.3)
LYMPHOCYTES # BLD AUTO: 1.23 K/UL — SIGNIFICANT CHANGE UP (ref 1.2–3.4)
LYMPHOCYTES # BLD AUTO: 27.3 % — SIGNIFICANT CHANGE UP (ref 20.5–51.1)
MAGNESIUM SERPL-MCNC: 2 MG/DL — SIGNIFICANT CHANGE UP (ref 1.8–2.4)
MCHC RBC-ENTMCNC: 30.7 PG — SIGNIFICANT CHANGE UP (ref 27–31)
MCHC RBC-ENTMCNC: 33.1 G/DL — SIGNIFICANT CHANGE UP (ref 32–37)
MCV RBC AUTO: 92.7 FL — SIGNIFICANT CHANGE UP (ref 80–94)
METHOD TYPE: SIGNIFICANT CHANGE UP
MONOCYTES # BLD AUTO: 0.63 K/UL — HIGH (ref 0.1–0.6)
MONOCYTES NFR BLD AUTO: 14 % — HIGH (ref 1.7–9.3)
NEUTROPHILS # BLD AUTO: 2.38 K/UL — SIGNIFICANT CHANGE UP (ref 1.4–6.5)
NEUTROPHILS NFR BLD AUTO: 53 % — SIGNIFICANT CHANGE UP (ref 42.2–75.2)
NRBC # BLD: 0 /100 WBCS — SIGNIFICANT CHANGE UP (ref 0–0)
PHOSPHATE SERPL-MCNC: 2.8 MG/DL — SIGNIFICANT CHANGE UP (ref 2.1–4.9)
PLATELET # BLD AUTO: 293 K/UL — SIGNIFICANT CHANGE UP (ref 130–400)
PMV BLD: 9.3 FL — SIGNIFICANT CHANGE UP (ref 7.4–10.4)
POTASSIUM SERPL-MCNC: 4 MMOL/L — SIGNIFICANT CHANGE UP (ref 3.5–5)
POTASSIUM SERPL-SCNC: 4 MMOL/L — SIGNIFICANT CHANGE UP (ref 3.5–5)
PROT SERPL-MCNC: 6.2 G/DL — SIGNIFICANT CHANGE UP (ref 6–8)
RBC # BLD: 4.11 M/UL — LOW (ref 4.7–6.1)
RBC # FLD: 11.9 % — SIGNIFICANT CHANGE UP (ref 11.5–14.5)
SODIUM SERPL-SCNC: 139 MMOL/L — SIGNIFICANT CHANGE UP (ref 135–146)
VANCOMYCIN TROUGH SERPL-MCNC: 5.4 UG/ML — SIGNIFICANT CHANGE UP (ref 5–10)
WBC # BLD: 4.5 K/UL — LOW (ref 4.8–10.8)
WBC # FLD AUTO: 4.5 K/UL — LOW (ref 4.8–10.8)

## 2024-11-04 PROCEDURE — 99232 SBSQ HOSP IP/OBS MODERATE 35: CPT

## 2024-11-04 RX ORDER — LINEZOLID 600 MG
600 TABLET ORAL EVERY 12 HOURS
Refills: 0 | Status: DISCONTINUED | OUTPATIENT
Start: 2024-11-04 | End: 2024-11-06

## 2024-11-04 RX ORDER — METRONIDAZOLE 250 MG/1
500 TABLET ORAL EVERY 8 HOURS
Refills: 0 | Status: DISCONTINUED | OUTPATIENT
Start: 2024-11-04 | End: 2024-11-06

## 2024-11-04 RX ORDER — METRONIDAZOLE 250 MG/1
500 TABLET ORAL EVERY 8 HOURS
Refills: 0 | Status: DISCONTINUED | OUTPATIENT
Start: 2024-11-04 | End: 2024-11-04

## 2024-11-04 RX ADMIN — MORPHINE SULFATE 4 MILLIGRAM(S): 30 TABLET, EXTENDED RELEASE ORAL at 21:16

## 2024-11-04 RX ADMIN — METRONIDAZOLE 100 MILLIGRAM(S): 250 TABLET ORAL at 01:13

## 2024-11-04 RX ADMIN — MORPHINE SULFATE 4 MILLIGRAM(S): 30 TABLET, EXTENDED RELEASE ORAL at 06:16

## 2024-11-04 RX ADMIN — CEFEPIME 100 MILLIGRAM(S): 2 INJECTION, POWDER, FOR SOLUTION INTRAVENOUS at 01:13

## 2024-11-04 RX ADMIN — Medication 975 MILLIGRAM(S): at 06:16

## 2024-11-04 RX ADMIN — VANCOMYCIN HYDROCHLORIDE 250 MILLIGRAM(S): KIT at 02:25

## 2024-11-04 RX ADMIN — Medication 975 MILLIGRAM(S): at 05:16

## 2024-11-04 RX ADMIN — MORPHINE SULFATE 4 MILLIGRAM(S): 30 TABLET, EXTENDED RELEASE ORAL at 05:17

## 2024-11-04 RX ADMIN — METRONIDAZOLE 100 MILLIGRAM(S): 250 TABLET ORAL at 10:36

## 2024-11-04 RX ADMIN — Medication 130 MILLIGRAM(S): at 18:01

## 2024-11-04 RX ADMIN — METRONIDAZOLE 500 MILLIGRAM(S): 250 TABLET ORAL at 18:01

## 2024-11-04 RX ADMIN — MORPHINE SULFATE 4 MILLIGRAM(S): 30 TABLET, EXTENDED RELEASE ORAL at 17:07

## 2024-11-04 RX ADMIN — MORPHINE SULFATE 4 MILLIGRAM(S): 30 TABLET, EXTENDED RELEASE ORAL at 10:45

## 2024-11-04 RX ADMIN — Medication 130 MILLIGRAM(S): at 05:17

## 2024-11-04 RX ADMIN — CEFEPIME 100 MILLIGRAM(S): 2 INJECTION, POWDER, FOR SOLUTION INTRAVENOUS at 18:01

## 2024-11-04 RX ADMIN — Medication 2: at 12:15

## 2024-11-04 RX ADMIN — Medication 2 UNIT(S): at 12:15

## 2024-11-04 RX ADMIN — Medication 975 MILLIGRAM(S): at 21:20

## 2024-11-04 RX ADMIN — Medication 975 MILLIGRAM(S): at 17:00

## 2024-11-04 RX ADMIN — MORPHINE SULFATE 4 MILLIGRAM(S): 30 TABLET, EXTENDED RELEASE ORAL at 14:45

## 2024-11-04 RX ADMIN — Medication 4: at 16:58

## 2024-11-04 RX ADMIN — VANCOMYCIN HYDROCHLORIDE 250 MILLIGRAM(S): KIT at 14:30

## 2024-11-04 RX ADMIN — Medication 975 MILLIGRAM(S): at 15:37

## 2024-11-04 RX ADMIN — Medication 5 UNIT(S): at 22:11

## 2024-11-04 RX ADMIN — Medication 2 UNIT(S): at 16:59

## 2024-11-04 RX ADMIN — CEFEPIME 100 MILLIGRAM(S): 2 INJECTION, POWDER, FOR SOLUTION INTRAVENOUS at 10:36

## 2024-11-04 RX ADMIN — Medication 2 UNIT(S): at 08:10

## 2024-11-04 RX ADMIN — MORPHINE SULFATE 4 MILLIGRAM(S): 30 TABLET, EXTENDED RELEASE ORAL at 09:16

## 2024-11-04 NOTE — PHARMACOTHERAPY INTERVENTION NOTE - COMMENTS
As discussed, agreed to change vancomycin to linezolid 600 mg PO q12h since vancomycin AUC/LEVI will be subtherapeutic despite dose optimization, as per PrecisePK calculations.    Jia Malave, PharmD, [facility]  Clinical Pharmacy Specialist, Infectious Diseases  Tele-Antimicrobial Stewardship Program (Tele-ASP)  Tele-ASP Phone: [phone]

## 2024-11-04 NOTE — PATIENT PROFILE ADULT - FUNCTIONAL SCREEN CURRENT LEVEL: COMMUNICATION, MLM
Appear viral  Rapid strep in office today  Increase fluids and rest  For hoarseness, encouraged voice rest  Follow up if symptoms not improving with treatment  
Medications/Labs/EKG/Imaging Studies
0 = understands/communicates without difficulty

## 2024-11-04 NOTE — PROGRESS NOTE ADULT - SUBJECTIVE AND OBJECTIVE BOX
Podiatry Progress Note    Subjective:  IRIS QUEVEDO is a  53y Male.   Seen bedside.   Patient is a 53y old  Male who presents with a chief complaint of Toe Abscess (02 Nov 2024 15:30). Patient underwent surgery with Podiatry on 11/02, Excisional Debridement of Soft Tissue and Bone with Incision and Drainage and washout of Right Foot.       Past Medical History and Surgical History  PAST MEDICAL & SURGICAL HISTORY:  DVT (deep venous thrombosis)  5 yr- left  4 yrs- right  pe - 4 yrs      Pulmonary embolism      History of surgery  IVC FILTER           Objective:  Vital Signs Last 24 Hrs  T(C): 36.7 (04 Nov 2024 06:00), Max: 36.7 (04 Nov 2024 00:45)  T(F): 98 (04 Nov 2024 06:00), Max: 98.1 (04 Nov 2024 00:45)  HR: 69 (04 Nov 2024 06:00) (61 - 69)  BP: 116/72 (04 Nov 2024 06:00) (107/70 - 116/72)  BP(mean): 87 (04 Nov 2024 06:00) (87 - 87)  RR: 19 (04 Nov 2024 06:00) (18 - 19)  SpO2: 95% (04 Nov 2024 00:45) (95% - 95%)    Parameters below as of 04 Nov 2024 00:45  Patient On (Oxygen Delivery Method): room air                            11.5   7.58  )-----------( 294      ( 03 Nov 2024 07:43 )             34.0                 11-03    138  |  103  |  12  ----------------------------<  157[H]  3.9   |  26  |  0.8    Ca    7.9[L]      03 Nov 2024 07:43          Physical Exam - Lower Extremity Focused:   Derm: Sutures at intact surgical site of Right plantar 1st submetatarsal, no signs of dehiscence, minimal swelling and erythema, Minimal sanginous drainage detected after compression when iodoform packing was removed.   Vascular: DP and PT Pulses Intact; Foot is Warm to Warm to the touch; Capillary Refill Time < 3 Seconds;    Neuro: Protective Sensation Diminished / Moderately Neuropathic   MSK: Pain On Palpation at Wound Site of Right 1st submetatarsal with compression     Assessment:  11/02, Excisional Debridement of Soft Tissue and Bone with Incision and Drainage and washout of Right Foot.     Plan:  Chart reviewed and Patient evaluated. All Questions and Concerns Addressed and Answered  New iodoform packing was placed  Local Wound Care; Wound Dressed with Xeroform- Gauze- Abdominal Pad- Kerlix- Ace bandage.    Weight Bearing Status; WBAT  Continue w/ Local Wound Care; Q24 Dressing Changes;  Podiatry will conitnue to monitor patient.   Discussed Plan w/ Attending; Dr. Ivy.     Podiatry        Podiatry Progress Note    Subjective:  IRIS QUEVEDO is a  53y Male.   Seen bedside.   Patient is a 53y old  Male who presents with a chief complaint of Toe Abscess (02 Nov 2024 15:30). Patient underwent surgery with Podiatry on 11/02, Excisional Debridement of Soft Tissue and Bone with Incision and Drainage and washout of Right Foot.       Past Medical History and Surgical History  PAST MEDICAL & SURGICAL HISTORY:  DVT (deep venous thrombosis)  5 yr- left  4 yrs- right  pe - 4 yrs      Pulmonary embolism      History of surgery  IVC FILTER           Objective:  Vital Signs Last 24 Hrs  T(C): 36.7 (04 Nov 2024 06:00), Max: 36.7 (04 Nov 2024 00:45)  T(F): 98 (04 Nov 2024 06:00), Max: 98.1 (04 Nov 2024 00:45)  HR: 69 (04 Nov 2024 06:00) (61 - 69)  BP: 116/72 (04 Nov 2024 06:00) (107/70 - 116/72)  BP(mean): 87 (04 Nov 2024 06:00) (87 - 87)  RR: 19 (04 Nov 2024 06:00) (18 - 19)  SpO2: 95% (04 Nov 2024 00:45) (95% - 95%)    Parameters below as of 04 Nov 2024 00:45  Patient On (Oxygen Delivery Method): room air                            11.5   7.58  )-----------( 294      ( 03 Nov 2024 07:43 )             34.0                 11-03    138  |  103  |  12  ----------------------------<  157[H]  3.9   |  26  |  0.8    Ca    7.9[L]      03 Nov 2024 07:43          Physical Exam - Lower Extremity Focused:   Derm: Sutures at intact surgical site of Right plantar 1st submetatarsal, no signs of dehiscence, minimal swelling and erythema, Minimal sanginous drainage detected after compression when iodoform packing was removed.   Vascular: DP and PT Pulses Intact; Foot is Warm to Warm to the touch; Capillary Refill Time < 3 Seconds;    Neuro: Protective Sensation Diminished / Moderately Neuropathic   MSK: Pain On Palpation at Wound Site of Right 1st submetatarsal with compression     AV DUPLEX   IMPRESSION:  DVT in the bilateral popliteal and peroneal veins. Superficial thrombophlebitis of the right lesser saphenous vein.    Assessment:  11/02, Excisional Debridement of Soft Tissue and Bone with Incision and Drainage and washout of Right Foot.     Plan:  Chart reviewed and Patient evaluated. All Questions and Concerns Addressed and Answered  New iodoform packing was placed  Local Wound Care; Wound Dressed with Xeroform- Gauze- Abdominal Pad- Kerlix- Ace bandage.    Weight Bearing Status; WBAT  Continue w/ Local Wound Care; Q24 Dressing Changes;  Appreciate Vascular Consult regarding AV Duplex Results  Podiatry will continue to monitor patient.   Discussed Plan w/ Attending; Dr. Ivy.     Podiatry

## 2024-11-04 NOTE — PROGRESS NOTE ADULT - SUBJECTIVE AND OBJECTIVE BOX
INTERVAL HPI/OVERNIGHT EVENTS:  Patient was seen and examined at bedside. As per nurse and patient, no o/n events, patient resting comfortably. Patient denies: fever, chills, dizziness, weakness, HA, Changes in vision, CP, palpitations, SOB, cough, N/V/D/C, dysuria, changes in bowel movements, LE edema. ROS otherwise negative. Complains of pain at the site of surgery.    VITAL SIGNS:  T(F): 98 (11-04-24 @ 12:23)  HR: 62 (11-04-24 @ 12:23)  BP: 112/70 (11-04-24 @ 12:23)  RR: 18 (11-04-24 @ 12:23)  SpO2: 95% (11-04-24 @ 00:45)  Wt(kg): --    PHYSICAL EXAM:    Constitutional: WDWN, NAD  HEENT: PERRL, EOMI, sclera non-icteric, neck supple, trachea midline, no masses, no JVD, MMM, good dentition  Respiratory: CTA b/l, good air entry b/l, no wheezing, no rhonchi, no rales, without accessory muscle use and no intercostal retractions  Cardiovascular: RRR, normal S1S2, no M/R/G  Gastrointestinal: soft, NTND, no masses palpable, BS normal  Extremities: Warm, well perfused, pulses equal bilateral upper and lower extremities, no edema, no clubbing  Neurological: AAOx3, CN Grossly intact  Skin: Normal temperature, warm, dry    MEDICATIONS  (STANDING):  acetaminophen     Tablet .. 975 milliGRAM(s) Oral every 8 hours  buprenorphine 8 mG/naloxone 2 mG SL  Tablet 1 Tablet(s) SubLingual two times a day  cefepime   IVPB 2000 milliGRAM(s) IV Intermittent every 8 hours  dextrose 5%. 1000 milliLiter(s) (50 mL/Hr) IV Continuous <Continuous>  dextrose 5%. 1000 milliLiter(s) (100 mL/Hr) IV Continuous <Continuous>  dextrose 50% Injectable 12.5 Gram(s) IV Push once  dextrose 50% Injectable 25 Gram(s) IV Push once  dextrose 50% Injectable 25 Gram(s) IV Push once  enoxaparin Injectable 130 milliGRAM(s) SubCutaneous every 12 hours  glucagon  Injectable 1 milliGRAM(s) IntraMuscular once  influenza   Vaccine 0.5 milliLiter(s) IntraMuscular once  insulin glargine Injectable (LANTUS) 5 Unit(s) SubCutaneous at bedtime  insulin lispro (ADMELOG) corrective regimen sliding scale   SubCutaneous three times a day before meals  insulin lispro Injectable (ADMELOG) 2 Unit(s) SubCutaneous three times a day before meals  metroNIDAZOLE  IVPB 500 milliGRAM(s) IV Intermittent every 8 hours  polyethylene glycol 3350 17 Gram(s) Oral daily  vancomycin  IVPB 1750 milliGRAM(s) IV Intermittent every 12 hours    MEDICATIONS  (PRN):  dextrose Oral Gel 15 Gram(s) Oral once PRN Blood Glucose LESS THAN 70 milliGRAM(s)/deciliter  morphine  - Injectable 4 milliGRAM(s) IV Push every 4 hours PRN Severe Pain (7 - 10)      Allergies    No Known Allergies    Intolerances        LABS:                        12.6   4.50  )-----------( 293      ( 04 Nov 2024 12:05 )             38.1     11-04    139  |  102  |  15  ----------------------------<  203[H]  4.0   |  28  |  0.7    Ca    8.6      04 Nov 2024 12:05  Phos  2.8     11-04  Mg     2.0     11-04    TPro  6.2  /  Alb  3.5  /  TBili  0.2  /  DBili  x   /  AST  16  /  ALT  13  /  AlkPhos  68  11-04      Urinalysis Basic - ( 04 Nov 2024 12:05 )    Color: x / Appearance: x / SG: x / pH: x  Gluc: 203 mg/dL / Ketone: x  / Bili: x / Urobili: x   Blood: x / Protein: x / Nitrite: x   Leuk Esterase: x / RBC: x / WBC x   Sq Epi: x / Non Sq Epi: x / Bacteria: x        RADIOLOGY & ADDITIONAL TESTS:  Reviewed

## 2024-11-04 NOTE — PHARMACOTHERAPY INTERVENTION NOTE - COMMENTS
Consistent with ID note, recommended to order metronidazole 500 mg PO q8h.    Jia Malave, PharmD, [facility]  Clinical Pharmacy Specialist, Infectious Diseases  Tele-Antimicrobial Stewardship Program (Tele-ASP)  Tele-ASP Phone: [phone]

## 2024-11-04 NOTE — PROGRESS NOTE ADULT - ASSESSMENT
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 Patient removed the nail but he started developing swelling, erythema, tenderness around the area of the right first metatarsus. Yesterday, he developed an episode of fever 101F so he went to urgent care and they referred him to the ED. He hasn't been following with a PCP and suspects he might have diabetes. No recent DTap given.     #Right foot abscess with cellulitis  - Hemodynamically stable, Sat 97% on RA, 101F at home  - WBC 13.25K, Hb 12.5, INR 1.07, Lytes Nl, Crea 0.8, Glu 265, LFTs Nl, lactate 1.2  - R foot/ankle xray: no acute bony changes  - POD2 debridement  - Pending wound Cx final results  - C/w cefepime 2g IV q8h, flagyl 500mg TID, vancomycin 1g IV q12h    #Bilateral DVTs  #Hx IVC placement 2014  - Duplex showed findings concerning for bilateral DVT in the popliteal vein  - Patient was started on therapeutic lovenox  - Pt states that there was an attempt to remove the IVC filter back in the past however they were never able to    DVT ppx: lovenox  GI ppx: not indicated  AAT  Diet: regular  Dispo: med

## 2024-11-05 LAB
ALBUMIN SERPL ELPH-MCNC: 3.5 G/DL — SIGNIFICANT CHANGE UP (ref 3.5–5.2)
ALP SERPL-CCNC: 70 U/L — SIGNIFICANT CHANGE UP (ref 30–115)
ALT FLD-CCNC: 48 U/L — HIGH (ref 0–41)
ANION GAP SERPL CALC-SCNC: 14 MMOL/L — SIGNIFICANT CHANGE UP (ref 7–14)
AST SERPL-CCNC: 72 U/L — HIGH (ref 0–41)
BASOPHILS # BLD AUTO: 0.03 K/UL — SIGNIFICANT CHANGE UP (ref 0–0.2)
BASOPHILS NFR BLD AUTO: 0.5 % — SIGNIFICANT CHANGE UP (ref 0–1)
BILIRUB SERPL-MCNC: 0.9 MG/DL — SIGNIFICANT CHANGE UP (ref 0.2–1.2)
BUN SERPL-MCNC: 15 MG/DL — SIGNIFICANT CHANGE UP (ref 10–20)
CALCIUM SERPL-MCNC: 8.5 MG/DL — SIGNIFICANT CHANGE UP (ref 8.4–10.5)
CHLORIDE SERPL-SCNC: 101 MMOL/L — SIGNIFICANT CHANGE UP (ref 98–110)
CO2 SERPL-SCNC: 22 MMOL/L — SIGNIFICANT CHANGE UP (ref 17–32)
CREAT SERPL-MCNC: 0.8 MG/DL — SIGNIFICANT CHANGE UP (ref 0.7–1.5)
EGFR: 106 ML/MIN/1.73M2 — SIGNIFICANT CHANGE UP
EOSINOPHIL # BLD AUTO: 0.32 K/UL — SIGNIFICANT CHANGE UP (ref 0–0.7)
EOSINOPHIL NFR BLD AUTO: 5.1 % — SIGNIFICANT CHANGE UP (ref 0–8)
GLUCOSE BLDC GLUCOMTR-MCNC: 136 MG/DL — HIGH (ref 70–99)
GLUCOSE BLDC GLUCOMTR-MCNC: 142 MG/DL — HIGH (ref 70–99)
GLUCOSE BLDC GLUCOMTR-MCNC: 143 MG/DL — HIGH (ref 70–99)
GLUCOSE BLDC GLUCOMTR-MCNC: 156 MG/DL — HIGH (ref 70–99)
GLUCOSE BLDC GLUCOMTR-MCNC: 161 MG/DL — HIGH (ref 70–99)
GLUCOSE BLDC GLUCOMTR-MCNC: 195 MG/DL — HIGH (ref 70–99)
GLUCOSE BLDC GLUCOMTR-MCNC: 217 MG/DL — HIGH (ref 70–99)
GLUCOSE SERPL-MCNC: 157 MG/DL — HIGH (ref 70–99)
HCT VFR BLD CALC: 41 % — LOW (ref 42–52)
HGB BLD-MCNC: 13.9 G/DL — LOW (ref 14–18)
IMM GRANULOCYTES NFR BLD AUTO: 0.3 % — SIGNIFICANT CHANGE UP (ref 0.1–0.3)
LYMPHOCYTES # BLD AUTO: 1.98 K/UL — SIGNIFICANT CHANGE UP (ref 1.2–3.4)
LYMPHOCYTES # BLD AUTO: 31.3 % — SIGNIFICANT CHANGE UP (ref 20.5–51.1)
MAGNESIUM SERPL-MCNC: 2.3 MG/DL — SIGNIFICANT CHANGE UP (ref 1.8–2.4)
MCHC RBC-ENTMCNC: 31.2 PG — HIGH (ref 27–31)
MCHC RBC-ENTMCNC: 33.9 G/DL — SIGNIFICANT CHANGE UP (ref 32–37)
MCV RBC AUTO: 91.9 FL — SIGNIFICANT CHANGE UP (ref 80–94)
MONOCYTES # BLD AUTO: 0.69 K/UL — HIGH (ref 0.1–0.6)
MONOCYTES NFR BLD AUTO: 10.9 % — HIGH (ref 1.7–9.3)
NEUTROPHILS # BLD AUTO: 3.29 K/UL — SIGNIFICANT CHANGE UP (ref 1.4–6.5)
NEUTROPHILS NFR BLD AUTO: 51.9 % — SIGNIFICANT CHANGE UP (ref 42.2–75.2)
NRBC # BLD: 0 /100 WBCS — SIGNIFICANT CHANGE UP (ref 0–0)
PHOSPHATE SERPL-MCNC: 2.8 MG/DL — SIGNIFICANT CHANGE UP (ref 2.1–4.9)
PLATELET # BLD AUTO: 331 K/UL — SIGNIFICANT CHANGE UP (ref 130–400)
PMV BLD: 9.3 FL — SIGNIFICANT CHANGE UP (ref 7.4–10.4)
POTASSIUM SERPL-MCNC: 4.7 MMOL/L — SIGNIFICANT CHANGE UP (ref 3.5–5)
POTASSIUM SERPL-SCNC: 4.7 MMOL/L — SIGNIFICANT CHANGE UP (ref 3.5–5)
PROT SERPL-MCNC: 6.5 G/DL — SIGNIFICANT CHANGE UP (ref 6–8)
RBC # BLD: 4.46 M/UL — LOW (ref 4.7–6.1)
RBC # FLD: 11.9 % — SIGNIFICANT CHANGE UP (ref 11.5–14.5)
SODIUM SERPL-SCNC: 137 MMOL/L — SIGNIFICANT CHANGE UP (ref 135–146)
WBC # BLD: 6.33 K/UL — SIGNIFICANT CHANGE UP (ref 4.8–10.8)
WBC # FLD AUTO: 6.33 K/UL — SIGNIFICANT CHANGE UP (ref 4.8–10.8)

## 2024-11-05 PROCEDURE — 73719 MRI LOWER EXTREMITY W/DYE: CPT | Mod: 26,RT

## 2024-11-05 PROCEDURE — 99232 SBSQ HOSP IP/OBS MODERATE 35: CPT

## 2024-11-05 RX ORDER — ONDANSETRON HYDROCHLORIDE 2 MG/ML
4 INJECTION, SOLUTION INTRAMUSCULAR; INTRAVENOUS ONCE
Refills: 0 | Status: COMPLETED | OUTPATIENT
Start: 2024-11-05 | End: 2024-11-05

## 2024-11-05 RX ORDER — CEFTRIAXONE SODIUM 10 G
2000 VIAL (EA) INJECTION EVERY 24 HOURS
Refills: 0 | Status: DISCONTINUED | OUTPATIENT
Start: 2024-11-05 | End: 2024-11-06

## 2024-11-05 RX ADMIN — METRONIDAZOLE 500 MILLIGRAM(S): 250 TABLET ORAL at 09:37

## 2024-11-05 RX ADMIN — Medication 600 MILLIGRAM(S): at 06:01

## 2024-11-05 RX ADMIN — MORPHINE SULFATE 4 MILLIGRAM(S): 30 TABLET, EXTENDED RELEASE ORAL at 08:47

## 2024-11-05 RX ADMIN — CEFEPIME 100 MILLIGRAM(S): 2 INJECTION, POWDER, FOR SOLUTION INTRAVENOUS at 01:50

## 2024-11-05 RX ADMIN — ONDANSETRON HYDROCHLORIDE 4 MILLIGRAM(S): 2 INJECTION, SOLUTION INTRAMUSCULAR; INTRAVENOUS at 23:30

## 2024-11-05 RX ADMIN — Medication 2 UNIT(S): at 08:31

## 2024-11-05 RX ADMIN — MORPHINE SULFATE 4 MILLIGRAM(S): 30 TABLET, EXTENDED RELEASE ORAL at 22:00

## 2024-11-05 RX ADMIN — METRONIDAZOLE 500 MILLIGRAM(S): 250 TABLET ORAL at 01:50

## 2024-11-05 RX ADMIN — Medication 100 MILLIGRAM(S): at 22:49

## 2024-11-05 RX ADMIN — Medication 975 MILLIGRAM(S): at 06:00

## 2024-11-05 RX ADMIN — MORPHINE SULFATE 4 MILLIGRAM(S): 30 TABLET, EXTENDED RELEASE ORAL at 21:04

## 2024-11-05 RX ADMIN — CEFEPIME 100 MILLIGRAM(S): 2 INJECTION, POWDER, FOR SOLUTION INTRAVENOUS at 09:37

## 2024-11-05 RX ADMIN — Medication 2: at 17:28

## 2024-11-05 RX ADMIN — POLYETHYLENE GLYCOL 3350 17 GRAM(S): 17 POWDER, FOR SOLUTION ORAL at 14:45

## 2024-11-05 RX ADMIN — MORPHINE SULFATE 4 MILLIGRAM(S): 30 TABLET, EXTENDED RELEASE ORAL at 09:24

## 2024-11-05 RX ADMIN — MORPHINE SULFATE 4 MILLIGRAM(S): 30 TABLET, EXTENDED RELEASE ORAL at 01:52

## 2024-11-05 RX ADMIN — MORPHINE SULFATE 4 MILLIGRAM(S): 30 TABLET, EXTENDED RELEASE ORAL at 14:54

## 2024-11-05 RX ADMIN — Medication 130 MILLIGRAM(S): at 06:01

## 2024-11-05 RX ADMIN — Medication 2 UNIT(S): at 17:29

## 2024-11-05 RX ADMIN — Medication 5 UNIT(S): at 22:42

## 2024-11-05 NOTE — PHARMACOTHERAPY INTERVENTION NOTE - COMMENTS
Recommended continuing linezolid 600mg PO q12h as per ID consult recommendations.    Nick Ribera, PharmD, [facility]  Clinical Pharmacy Specialist, Infectious Diseases  Tele-Antimicrobial Stewardship Program (Tele-ASP)  Tele-ASP Phone: [phone]

## 2024-11-05 NOTE — PROGRESS NOTE ADULT - ATTENDING COMMENTS
s/p right hallux I&D of penetrating wound  persistent erythema to the hallux. no fluctuance appreciated  recommend MRI w/ contrast to evaluate for any additional purulent pockets
Medicine Attending Addendum  Patient was seen and examined with medicine team.  Nursing records reviewed. I agree with the resident/PA/NP's note including past medical history, home medications, social history, allergies, surgical history, family history, and review of system. I have reviewed relevant vitals, laboratory values, imaging studies, and microbiology.   - Above resident's note was edited by me  - No overnight issue. AST/ALT trends up -> will do med review, avoid nephrotoxic medications, monitor.  - Pending MRI w/ contrast to evaluate for any additional purulent pockets as per podiatry  - WCx speciation is completed. Will discuss with ID about abx selection and duration.  - Appreciate wound care RN recs; will follow    - rest of A/P as per detailed housestaff note above except above modifications    Total time spent to complete patient's bedside assessment, reviewed medical chart, discussed medical plan of care with team was 45 minutes with >50% of time spent face to face with patient, discussion with patient/family and/or coordination of care.
Medicine Attending Addendum  Patient was seen and examined with medicine team.  Nursing records reviewed. I agree with the resident/PA/NP's note including past medical history, home medications, social history, allergies, surgical history, family history, and review of system. I have reviewed relevant vitals, laboratory values, imaging studies, and microbiology.   - Above resident's note was edited by me  - rest of A/P as per detailed housestaff note above except above modifications    Total time spent to complete patient's bedside assessment, reviewed medical chart, discussed medical plan of care with team was 45 minutes with >50% of time spent face to face with patient, discussion with patient/family and/or coordination of care.
A 53 year old man PMH of diabetes mellitus, DVT/PE 10 years ago, s/p IVC which is still in place and not on AC, presented to the ED for right foot pain and swelling after stepping on a nail at work 2 days ago. Patient removed the nail but he started developing swelling, erythema, tenderness around the area of the right first metatarsus. The day before admission, he developed an episode of fever 101F so he went to urgent care and they referred him to the ED. He hasn't been following with a PCP and suspects he might have diabetes. No recent DTap given.    #Right foot abscess with cellulitis  Podiatry following, had debridement today  Infectious disease consulted  Pain regimen ordered  Wound cultures 11/2 pending  - C/w cefepime 2g IV q8h, flagyl 500mg TID, vancomycin 1g IV q12h    #Bilateral DVTs  -Duplex showed findings concerning for bilateral DVT in the popliteal vein  -Patient was started on therapeutic Lovenox  Likely needs anticoagulation on discharge    #Diabetes mellitus  Hb A1C 7.3  Lantus 5 units QHS  Lispro 2 units AC  Lispro sliding insulin scale    DVT ppx: therapeutic lovenox  AAT  Diet: diabetic diet  Dispo: pending cultures.  Will need evaluation by PT.

## 2024-11-05 NOTE — PROGRESS NOTE ADULT - ASSESSMENT
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 Patient removed the nail but he started developing swelling, erythema, tenderness around the area of the right first metatarsus. Yesterday, he developed an episode of fever 101F so he went to urgent care and they referred him to the ED. He hasn't been following with a PCP and suspects he might have diabetes. No recent DTap given.    IMPRESSION  #Right Foot Cellulitis/Abscess after stepping on nail  - Xray Foot AP + Lateral + Oblique, Right (11.01.24 @ 11:06): No acute bony abnormality.  - s/p OR debridement 11/2 - wound Cx Staph simulans and Staph epidermidis   - MR Foot w/ IV Cont, Right (11.05.24 @ 13:23): Plantar wound to the level of the first MTP, with findings consistent  with septic arthritis of the first MTP with osteomyelitis at the tibial   sesamoid and plantar aspect of the medial first metatarsal head. Phlegmon surrounding the wound, with small abscesses along plantar  surface of the flexor pollicis longus tendon largest measuring 1.6 x 0.8   x 0.4 cm at the level of the first interphalangeal joint.    Sedimentation Rate, Erythrocyte: 40 mm/Hr (11.01.24 @ 19:59)  C-Reactive Protein: 170.0 mg/L (11.01.24 @ 19:59)    #DM II - newly diagnosed    #Hx of DVT/PE s/p IVC filter  #Obesity BMI (kg/m2): 38.3  #Abx allergy: No Known Allergies    RECOMMENDATIONS  - noted MRI findings -- ongoing abscess at first interphalangeal joint and septic arthritis of MTP joint  - continue Linezolid 600 mg q 12 hours   - narrow cefepime to ceftriaxone 2g daily as OR Cx negative for GNR  - continue flagyl 500 mg q 8 hours  - podiatry follow-up regarding debridement/possible resection     Please call or message on [employer] Teams if with any questions.  Spectra [phone]

## 2024-11-05 NOTE — PROGRESS NOTE ADULT - SUBJECTIVE AND OBJECTIVE BOX
IRIS QUEVEDO  53y, Male  Allergy: No Known Allergies      LOS  4d    CHIEF COMPLAINT: Toe Abscess (02 Nov 2024 15:30)      INTERVAL EVENTS/HPI  - No acute events overnight  - T(F): , Max: 98.6 (11-05-24 @ 14:56)  - WBC Count: 6.33 (11-05-24 @ 07:35)  WBC Count: 4.50 (11-04-24 @ 12:05)     - Creatinine: 0.8 (11-05-24 @ 07:35)  Creatinine: 0.7 (11-04-24 @ 12:05)       ROS  General: Denies rigors, nightsweats  HEENT: Denies headache, rhinorrhea, sore throat, eye pain  CV: Denies CP, palpitations  PULM: Denies wheezing, hemoptysis  GI: Denies hematemesis, hematochezia, melena  : Denies discharge, hematuria  MSK: Denies arthralgias, myalgias  SKIN: Denies rash, lesions  NEURO: Denies paresthesias, weakness  PSYCH: Denies depression, anxiety    VITALS:  T(F): 98.6, Max: 98.6 (11-05-24 @ 14:56)  HR: 82  BP: 115/74  RR: 18Vital Signs Last 24 Hrs  T(C): 37 (05 Nov 2024 14:56), Max: 37 (05 Nov 2024 14:56)  T(F): 98.6 (05 Nov 2024 14:56), Max: 98.6 (05 Nov 2024 14:56)  HR: 82 (05 Nov 2024 14:56) (63 - 88)  BP: 115/74 (05 Nov 2024 14:56) (115/74 - 125/73)  BP(mean): --  RR: 18 (05 Nov 2024 14:56) (18 - 18)  SpO2: 98% (05 Nov 2024 14:56) (96% - 98%)    Parameters below as of 04 Nov 2024 20:36  Patient On (Oxygen Delivery Method): room air        PHYSICAL EXAM:  Gen: NAD, resting in bed  HEENT: Normocephalic, atraumatic  Neck: supple, no lymphadenopathy  CV: Regular rate & regular rhythm  Lungs: decreased BS at bases, no fremitus  Abdomen: Soft, BS present  Ext: Warm, well perfused  Neuro: non focal, awake  Skin: hallux remains swollen -- swelling by forefoot is improved  Lines: no phlebitis    FH: Non-contributory  Social Hx: Non-contributory    TESTS & MEASUREMENTS:                        13.9   6.33  )-----------( 331      ( 05 Nov 2024 07:35 )             41.0     11-05    137  |  101  |  15  ----------------------------<  157[H]  4.7   |  22  |  0.8    Ca    8.5      05 Nov 2024 07:35  Phos  2.8     11-05  Mg     2.3     11-05    TPro  6.5  /  Alb  3.5  /  TBili  0.9  /  DBili  x   /  AST  72[H]  /  ALT  48[H]  /  AlkPhos  70  11-05      LIVER FUNCTIONS - ( 05 Nov 2024 07:35 )  Alb: 3.5 g/dL / Pro: 6.5 g/dL / ALK PHOS: 70 U/L / ALT: 48 U/L / AST: 72 U/L / GGT: x           Urinalysis Basic - ( 05 Nov 2024 07:35 )    Color: x / Appearance: x / SG: x / pH: x  Gluc: 157 mg/dL / Ketone: x  / Bili: x / Urobili: x   Blood: x / Protein: x / Nitrite: x   Leuk Esterase: x / RBC: x / WBC x   Sq Epi: x / Non Sq Epi: x / Bacteria: x        Culture - Acid Fast - Other w/Smear (collected 11-02-24 @ 11:37)  Source: .Other    Culture - Wound Aerobic/Anaerobic (collected 11-02-24 @ 11:37)  Source: .Surgical Swab  Preliminary Report (11-04-24 @ 22:34):    Few Staphylococcus simulans    Few Staphylococcus epidermidis "Susceptibilities not performed"  Organism: Staphylococcus simulans (11-04-24 @ 20:46)  Organism: Staphylococcus simulans (11-04-24 @ 20:46)      -  Clindamycin: S <=0.25      -  Oxacillin: R >2      -  Gentamicin: S <=1 Should not be used as monotherapy      -  Vancomycin: S 1      -  Tetracycline: S <=1      Method Type: LEVI      -  Rifampin: S <=1 Should not be used as monotherapy      -  Erythromycin: S <=0.25      -  Trimethoprim/Sulfamethoxazole: S <=0.5/9.5    Culture - Blood (collected 11-01-24 @ 10:40)  Source: .Blood BLOOD  Preliminary Report (11-05-24 @ 16:01):    No growth at 4 days    Culture - Blood (collected 11-01-24 @ 10:40)  Source: .Blood BLOOD  Preliminary Report (11-05-24 @ 16:01):    No growth at 4 days        Lactate, Blood: 1.2 mmol/L (11-01-24 @ 10:40)      INFECTIOUS DISEASES TESTING      INFLAMMATORY MARKERS  Sedimentation Rate, Erythrocyte: 40 mm/Hr (11-01-24 @ 19:59)  C-Reactive Protein: 170.0 mg/L (11-01-24 @ 19:59)      RADIOLOGY & ADDITIONAL TESTS:  I have personally reviewed the last available Chest xray  CXR      CT      CARDIOLOGY TESTING  12 Lead ECG:   Ventricular Rate 74 BPM    Atrial Rate 74 BPM    P-R Interval 170 ms    QRS Duration 108 ms    Q-T Interval 386 ms    QTC Calculation(Bazett) 428 ms    P Axis 55 degrees    R Axis 100 degrees    T Axis 29 degrees    Diagnosis Line Normal sinus rhythm  Rightward axis  Incomplete right bundle branch block  Borderline ECG    Confirmed by Guanakito Zamora ([id number]) on 11/2/2024 12:34:45 AM (11-01-24 @ 14:08)      MEDICATIONS  buprenorphine 8 mG/naloxone 2 mG SL  Tablet 1 SubLingual two times a day  cefepime   IVPB 2000 IV Intermittent every 8 hours  dextrose 5%. 1000 IV Continuous <Continuous>  dextrose 5%. 1000 IV Continuous <Continuous>  dextrose 50% Injectable 12.5 IV Push once  dextrose 50% Injectable 25 IV Push once  dextrose 50% Injectable 25 IV Push once  enoxaparin Injectable 130 SubCutaneous every 12 hours  glucagon  Injectable 1 IntraMuscular once  influenza   Vaccine 0.5 IntraMuscular once  insulin glargine Injectable (LANTUS) 5 SubCutaneous at bedtime  insulin lispro (ADMELOG) corrective regimen sliding scale  SubCutaneous three times a day before meals  insulin lispro Injectable (ADMELOG) 2 SubCutaneous three times a day before meals  linezolid    Tablet 600 Oral every 12 hours  metroNIDAZOLE    Tablet 500 Oral every 8 hours  polyethylene glycol 3350 17 Oral daily      WEIGHT  Weight (kg): 131.5 (11-02-24 @ 10:52)  Creatinine: 0.8 mg/dL (11-05-24 @ 07:35)      ANTIBIOTICS:  cefepime   IVPB 2000 milliGRAM(s) IV Intermittent every 8 hours  linezolid    Tablet 600 milliGRAM(s) Oral every 12 hours  metroNIDAZOLE    Tablet 500 milliGRAM(s) Oral every 8 hours      All available historical records have been reviewed

## 2024-11-05 NOTE — ADVANCED PRACTICE NURSE CONSULT - RECOMMEDATIONS
Cleanse wounds to left shin with soap and water.   Pat dry, apply Xeroform, wrap with Kerlix twice a day and prn for soiling.     Maintain pressure injury prevention.   Keep skin clean.   Maintain incontinence care.   Monitor skin for changes and notify provider   Rest of care per primary team  Case discussed with primary RN Cleanse wounds to left shin with soap and water.   Pat dry, apply Xeroform, wrap with Kerlix twice a day and prn for soiling.   Recommend follow up with Vascular for further recommendations    Maintain pressure injury prevention.   Keep skin clean.   Maintain incontinence care.   Monitor skin for changes and notify provider   Rest of care per primary team  Case discussed with primary RN

## 2024-11-05 NOTE — PHARMACOTHERAPY INTERVENTION NOTE - COMMENTS
Recommended de-escalating from cefepime to ceftriaxone 2g IV q24h as per ID consult recommendations.    Nick Ribera, PharmD, [facility]  Clinical Pharmacy Specialist, Infectious Diseases  Tele-Antimicrobial Stewardship Program (Tele-ASP)  Tele-ASP Phone: [phone]

## 2024-11-05 NOTE — PROGRESS NOTE ADULT - ASSESSMENT
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 Patient removed the nail but he started developing swelling, erythema, tenderness around the area of the right first metatarsus. Yesterday, he developed an episode of fever 101F so he went to urgent care and they referred him to the ED. He hasn't been following with a PCP and suspects he might have diabetes. No recent DTap given.     #Right foot abscess with cellulitis  - Hemodynamically stable, Sat 97% on RA, 101F at home  - WBC 13.25K, Hb 12.5, INR 1.07, Lytes Nl, Crea 0.8, Glu 265, LFTs Nl, lactate 1.2  - R foot/ankle xray: no acute bony changes  - POD3 debridement  - Wound Cx: staph simulans and staph epidermidis  - C/w cefepime 2g IV q8h, flagyl 500mg TID  - IV vancomycin switched to PO Linezolid  - Per podiatry: persistent erythema to the hallux. no fluctuance appreciated. Recommend MRI w/ contrast to evaluate for any additional purulent pockets.    #Bilateral DVTs  #Hx IVC placement 2014  - Duplex showed findings concerning for bilateral DVT in the popliteal vein  - Patient was started on therapeutic lovenox  - Pt states that there was an attempt to remove the IVC filter back in the past however they were never able to    DVT ppx: lovenox  GI ppx: not indicated  AAT  Diet: regular  Dispo: med    Pending: MR R foot w/ contrast

## 2024-11-05 NOTE — PHARMACOTHERAPY INTERVENTION NOTE - COMMENTS
Recommended continuing metronidazole 500mg PO q8h as per ID consult recommendations.    Nick Ribera, PharmD, BCIDP  Clinical Pharmacy Specialist, Infectious Diseases  Tele-Antimicrobial Stewardship Program (Tele-ASP)  Tele-ASP Phone: [phone]

## 2024-11-05 NOTE — PHARMACOTHERAPY INTERVENTION NOTE - NSPHARMCOMMASP
ASP - De-escalation
ASP - Dose optimization/Non-Renal dose adjustment
ASP - Therapy recommended/ Alternative therapy
ASP - Lab/ test recommended
ASP - De-escalation
ASP - Lab/ test recommended
ASP - Therapy recommended/ Alternative therapy

## 2024-11-05 NOTE — ADVANCED PRACTICE NURSE CONSULT - ASSESSMENT
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 Patient removed the nail but he started developing swelling, erythema, tenderness around the area of the right first metatarsus. Yesterday, he developed an episode of fever 101F so he went to urgent care and they referred him to the ED. He hasn't been following with a PCP and suspects he might have diabetes. No recent DTap given. Admitted for R foot abscess with cellulitis.        Patient received lying in bed. Alert and oriented. Consulted for wound to left lower leg.     Type of Wound: Ulcerations  Location: Left shin  Wound bed: Two ulcerations, one small dried scab, one larger dried scab with areas of pink partial thickness skin loss  Wound edges: Intact  Periwound: Hemosiderin staining  Wound exudate: Scant serous  Wound odor: None  Induration, erythema, warmth: No  Wound pain: Tender to palpation    Patient states has had venous ulcers in the past and used to see Vascular specialist.

## 2024-11-05 NOTE — PROGRESS NOTE ADULT - SUBJECTIVE AND OBJECTIVE BOX
INTERVAL HPI/OVERNIGHT EVENTS:  Patient was seen and examined at bedside. As per nurse and patient, no o/n events, patient resting comfortably. No complaints at this time. Patient denies: fever, chills, dizziness, weakness, HA, Changes in vision, CP, palpitations, SOB, cough, N/V/D/C, dysuria, changes in bowel movements, LE edema. ROS otherwise negative.    VITAL SIGNS:  T(F): 97.4 (11-05-24 @ 05:16)  HR: 88 (11-05-24 @ 05:16)  BP: 116/74 (11-05-24 @ 05:16)  RR: 18 (11-05-24 @ 05:16)  SpO2: 98% (11-05-24 @ 05:16)  Wt(kg): --    PHYSICAL EXAM:    Constitutional: WDWN, NAD  HEENT: PERRL, EOMI, sclera non-icteric, neck supple, trachea midline, no masses, no JVD, MMM, good dentition  Respiratory: CTA b/l, good air entry b/l, no wheezing, no rhonchi, no rales, without accessory muscle use and no intercostal retractions  Cardiovascular: RRR, normal S1S2, no M/R/G  Gastrointestinal: soft, NTND, no masses palpable, BS normal  Extremities: Warm, well perfused, pulses equal bilateral upper and lower extremities, no edema, no clubbing  Neurological: AAOx3, CN Grossly intact  Skin: Normal temperature, warm, dry    MEDICATIONS  (STANDING):  acetaminophen     Tablet .. 975 milliGRAM(s) Oral every 8 hours  buprenorphine 8 mG/naloxone 2 mG SL  Tablet 1 Tablet(s) SubLingual two times a day  cefepime   IVPB 2000 milliGRAM(s) IV Intermittent every 8 hours  dextrose 5%. 1000 milliLiter(s) (50 mL/Hr) IV Continuous <Continuous>  dextrose 5%. 1000 milliLiter(s) (100 mL/Hr) IV Continuous <Continuous>  dextrose 50% Injectable 12.5 Gram(s) IV Push once  dextrose 50% Injectable 25 Gram(s) IV Push once  dextrose 50% Injectable 25 Gram(s) IV Push once  enoxaparin Injectable 130 milliGRAM(s) SubCutaneous every 12 hours  glucagon  Injectable 1 milliGRAM(s) IntraMuscular once  influenza   Vaccine 0.5 milliLiter(s) IntraMuscular once  insulin glargine Injectable (LANTUS) 5 Unit(s) SubCutaneous at bedtime  insulin lispro (ADMELOG) corrective regimen sliding scale   SubCutaneous three times a day before meals  insulin lispro Injectable (ADMELOG) 2 Unit(s) SubCutaneous three times a day before meals  linezolid    Tablet 600 milliGRAM(s) Oral every 12 hours  metroNIDAZOLE    Tablet 500 milliGRAM(s) Oral every 8 hours  polyethylene glycol 3350 17 Gram(s) Oral daily    MEDICATIONS  (PRN):  dextrose Oral Gel 15 Gram(s) Oral once PRN Blood Glucose LESS THAN 70 milliGRAM(s)/deciliter  morphine  - Injectable 4 milliGRAM(s) IV Push every 4 hours PRN Severe Pain (7 - 10)      Allergies    No Known Allergies    Intolerances        LABS:                        13.9   6.33  )-----------( 331      ( 05 Nov 2024 07:35 )             41.0     11-04    139  |  102  |  15  ----------------------------<  203[H]  4.0   |  28  |  0.7    Ca    8.6      04 Nov 2024 12:05  Phos  2.8     11-04  Mg     2.0     11-04    TPro  6.2  /  Alb  3.5  /  TBili  0.2  /  DBili  x   /  AST  16  /  ALT  13  /  AlkPhos  68  11-04      Urinalysis Basic - ( 04 Nov 2024 12:05 )    Color: x / Appearance: x / SG: x / pH: x  Gluc: 203 mg/dL / Ketone: x  / Bili: x / Urobili: x   Blood: x / Protein: x / Nitrite: x   Leuk Esterase: x / RBC: x / WBC x   Sq Epi: x / Non Sq Epi: x / Bacteria: x        RADIOLOGY & ADDITIONAL TESTS:  Reviewed

## 2024-11-06 ENCOUNTER — TRANSCRIPTION ENCOUNTER (OUTPATIENT)
Age: 54
End: 2024-11-06

## 2024-11-06 VITALS
RESPIRATION RATE: 18 BRPM | DIASTOLIC BLOOD PRESSURE: 65 MMHG | TEMPERATURE: 98 F | HEART RATE: 70 BPM | SYSTOLIC BLOOD PRESSURE: 124 MMHG | OXYGEN SATURATION: 98 %

## 2024-11-06 LAB
ALBUMIN SERPL ELPH-MCNC: 3.4 G/DL — LOW (ref 3.5–5.2)
ALP SERPL-CCNC: 71 U/L — SIGNIFICANT CHANGE UP (ref 30–115)
ALT FLD-CCNC: 110 U/L — HIGH (ref 0–41)
ANION GAP SERPL CALC-SCNC: 14 MMOL/L — SIGNIFICANT CHANGE UP (ref 7–14)
AST SERPL-CCNC: 149 U/L — HIGH (ref 0–41)
BASOPHILS # BLD AUTO: 0.02 K/UL — SIGNIFICANT CHANGE UP (ref 0–0.2)
BASOPHILS NFR BLD AUTO: 0.4 % — SIGNIFICANT CHANGE UP (ref 0–1)
BILIRUB SERPL-MCNC: 0.2 MG/DL — SIGNIFICANT CHANGE UP (ref 0.2–1.2)
BUN SERPL-MCNC: 14 MG/DL — SIGNIFICANT CHANGE UP (ref 10–20)
CALCIUM SERPL-MCNC: 8.6 MG/DL — SIGNIFICANT CHANGE UP (ref 8.4–10.5)
CHLORIDE SERPL-SCNC: 103 MMOL/L — SIGNIFICANT CHANGE UP (ref 98–110)
CO2 SERPL-SCNC: 23 MMOL/L — SIGNIFICANT CHANGE UP (ref 17–32)
CREAT SERPL-MCNC: 0.8 MG/DL — SIGNIFICANT CHANGE UP (ref 0.7–1.5)
CULTURE RESULTS: SIGNIFICANT CHANGE UP
CULTURE RESULTS: SIGNIFICANT CHANGE UP
EGFR: 106 ML/MIN/1.73M2 — SIGNIFICANT CHANGE UP
EOSINOPHIL # BLD AUTO: 0.24 K/UL — SIGNIFICANT CHANGE UP (ref 0–0.7)
EOSINOPHIL NFR BLD AUTO: 4.8 % — SIGNIFICANT CHANGE UP (ref 0–8)
GLUCOSE BLDC GLUCOMTR-MCNC: 152 MG/DL — HIGH (ref 70–99)
GLUCOSE BLDC GLUCOMTR-MCNC: 172 MG/DL — HIGH (ref 70–99)
GLUCOSE SERPL-MCNC: 140 MG/DL — HIGH (ref 70–99)
HCT VFR BLD CALC: 41.2 % — LOW (ref 42–52)
HGB BLD-MCNC: 13.9 G/DL — LOW (ref 14–18)
IMM GRANULOCYTES NFR BLD AUTO: 0.6 % — HIGH (ref 0.1–0.3)
LYMPHOCYTES # BLD AUTO: 1.63 K/UL — SIGNIFICANT CHANGE UP (ref 1.2–3.4)
LYMPHOCYTES # BLD AUTO: 32.3 % — SIGNIFICANT CHANGE UP (ref 20.5–51.1)
MAGNESIUM SERPL-MCNC: 2 MG/DL — SIGNIFICANT CHANGE UP (ref 1.8–2.4)
MCHC RBC-ENTMCNC: 31.2 PG — HIGH (ref 27–31)
MCHC RBC-ENTMCNC: 33.7 G/DL — SIGNIFICANT CHANGE UP (ref 32–37)
MCV RBC AUTO: 92.6 FL — SIGNIFICANT CHANGE UP (ref 80–94)
MONOCYTES # BLD AUTO: 0.52 K/UL — SIGNIFICANT CHANGE UP (ref 0.1–0.6)
MONOCYTES NFR BLD AUTO: 10.3 % — HIGH (ref 1.7–9.3)
NEUTROPHILS # BLD AUTO: 2.61 K/UL — SIGNIFICANT CHANGE UP (ref 1.4–6.5)
NEUTROPHILS NFR BLD AUTO: 51.6 % — SIGNIFICANT CHANGE UP (ref 42.2–75.2)
NRBC # BLD: 0 /100 WBCS — SIGNIFICANT CHANGE UP (ref 0–0)
PHOSPHATE SERPL-MCNC: 3.4 MG/DL — SIGNIFICANT CHANGE UP (ref 2.1–4.9)
PLATELET # BLD AUTO: 326 K/UL — SIGNIFICANT CHANGE UP (ref 130–400)
PMV BLD: 9.2 FL — SIGNIFICANT CHANGE UP (ref 7.4–10.4)
POTASSIUM SERPL-MCNC: 5.3 MMOL/L — HIGH (ref 3.5–5)
POTASSIUM SERPL-SCNC: 5.3 MMOL/L — HIGH (ref 3.5–5)
PROT SERPL-MCNC: 6.6 G/DL — SIGNIFICANT CHANGE UP (ref 6–8)
RBC # BLD: 4.45 M/UL — LOW (ref 4.7–6.1)
RBC # FLD: 11.9 % — SIGNIFICANT CHANGE UP (ref 11.5–14.5)
SODIUM SERPL-SCNC: 140 MMOL/L — SIGNIFICANT CHANGE UP (ref 135–146)
SPECIMEN SOURCE: SIGNIFICANT CHANGE UP
SPECIMEN SOURCE: SIGNIFICANT CHANGE UP
SURGICAL PATHOLOGY STUDY: SIGNIFICANT CHANGE UP
WBC # BLD: 5.05 K/UL — SIGNIFICANT CHANGE UP (ref 4.8–10.8)
WBC # FLD AUTO: 5.05 K/UL — SIGNIFICANT CHANGE UP (ref 4.8–10.8)

## 2024-11-06 PROCEDURE — 99238 HOSP IP/OBS DSCHRG MGMT 30/<: CPT

## 2024-11-06 RX ORDER — OXYCODONE HYDROCHLORIDE 30 MG/1
1 TABLET ORAL
Qty: 12 | Refills: 0
Start: 2024-11-06 | End: 2024-11-08

## 2024-11-06 RX ORDER — LEVOFLOXACIN 750 MG/1
1 TABLET, FILM COATED ORAL
Qty: 42 | Refills: 0
Start: 2024-11-06 | End: 2024-12-17

## 2024-11-06 RX ORDER — DOXYCYCLINE HYCLATE 100 MG/1
1 TABLET, FILM COATED ORAL
Qty: 84 | Refills: 0
Start: 2024-11-06 | End: 2024-12-17

## 2024-11-06 RX ORDER — IBUPROFEN 200 MG
1 TABLET ORAL
Qty: 0 | Refills: 0 | DISCHARGE

## 2024-11-06 RX ADMIN — METRONIDAZOLE 500 MILLIGRAM(S): 250 TABLET ORAL at 06:39

## 2024-11-06 RX ADMIN — Medication 130 MILLIGRAM(S): at 06:41

## 2024-11-06 RX ADMIN — Medication 2 UNIT(S): at 11:21

## 2024-11-06 RX ADMIN — MORPHINE SULFATE 4 MILLIGRAM(S): 30 TABLET, EXTENDED RELEASE ORAL at 14:33

## 2024-11-06 RX ADMIN — MORPHINE SULFATE 4 MILLIGRAM(S): 30 TABLET, EXTENDED RELEASE ORAL at 06:40

## 2024-11-06 RX ADMIN — Medication 600 MILLIGRAM(S): at 06:39

## 2024-11-06 RX ADMIN — MORPHINE SULFATE 4 MILLIGRAM(S): 30 TABLET, EXTENDED RELEASE ORAL at 14:29

## 2024-11-06 RX ADMIN — Medication 2 UNIT(S): at 08:09

## 2024-11-06 RX ADMIN — METRONIDAZOLE 500 MILLIGRAM(S): 250 TABLET ORAL at 09:31

## 2024-11-06 RX ADMIN — Medication 2: at 11:22

## 2024-11-06 RX ADMIN — Medication 2: at 08:09

## 2024-11-06 NOTE — PROGRESS NOTE ADULT - ASSESSMENT
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 Patient removed the nail but he started developing swelling, erythema, tenderness around the area of the right first metatarsus. Yesterday, he developed an episode of fever 101F so he went to urgent care and they referred him to the ED. He hasn't been following with a PCP and suspects he might have diabetes. No recent DTap given.    IMPRESSION  #Right Foot Cellulitis/Abscess after stepping on nail  - Xray Foot AP + Lateral + Oblique, Right (11.01.24 @ 11:06): No acute bony abnormality.  - s/p OR debridement 11/2 - wound Cx Staph simulans and Staph epidermidis   - MR Foot w/ IV Cont, Right (11.05.24 @ 13:23): Plantar wound to the level of the first MTP, with findings consistent  with septic arthritis of the first MTP with osteomyelitis at the tibial sesamoid and plantar aspect of the medial first metatarsal head. Phlegmon surrounding the wound, with small abscesses along plantar  surface of the flexor pollicis longus tendon largest measuring 1.6 x 0.8  x 0.4 cm at the level of the first interphalangeal joint.    Sedimentation Rate, Erythrocyte: 40 mm/Hr (11.01.24 @ 19:59)  C-Reactive Protein: 170.0 mg/L (11.01.24 @ 19:59)    #DM II - newly diagnosed    #Hx of DVT/PE s/p IVC filter  #Obesity BMI (kg/m2): 38.3  #Abx allergy: No Known Allergies    12 Lead ECG (11.01.24 @ 14:08): QTC Calculation(Bazett) 428 ms    RECOMMENDATIONS  - discussed with patient that will be difficult to eradiate infection without surgical debridement -- also oferred IV antibiotics for agressive medical maangement, but wants to do orals   - can switch to PO doxycyline 100 mg BID and PO Levofloxacin 750 mg daily   -- discussed SE including tendinopathy   - stop flagyl   - will need at least 6 weeks from discharge, but this will depend on eventual surgical plans to be decided outpatient    - Weekly CBC, CMP, ESR/CRP  - ID follow-up with Dr. Jose Solitario for Telehealth. We will call the patient between 10:30-1:30      [address] [facility]       [phone]       Fax [phone]    Please call or message on Microsoft Teams if with any questions.  Spectra [phone]

## 2024-11-06 NOTE — DISCHARGE NOTE PROVIDER - PROVIDER TOKENS
PROVIDER:[TOKEN:[[id number]],FOLLOWUP:[1 week],ESTABLISHEDPATIENT:[T]],PROVIDER:[TOKEN:[[id number]],FOLLOWUP:[1 week],ESTABLISHEDPATIENT:[T]],PROVIDER:[TOKEN:[[id number]],FOLLOWUP:[1 week],ESTABLISHEDPATIENT:[T]]

## 2024-11-06 NOTE — PROGRESS NOTE ADULT - PROVIDER SPECIALTY LIST ADULT
Internal Medicine
Internal Medicine
Podiatry
Infectious Disease
Internal Medicine
Infectious Disease

## 2024-11-06 NOTE — DISCHARGE NOTE NURSING/CASE MANAGEMENT/SOCIAL WORK - PATIENT PORTAL LINK FT
You can access the FollowMyHealth Patient Portal offered by [facility] by registering at the following website: [email or web address]. By joining [facility]’s FollowMyHealth portal, you will also be able to view your health information using other applications (apps) compatible with our system.

## 2024-11-06 NOTE — PROGRESS NOTE ADULT - SUBJECTIVE AND OBJECTIVE BOX
Podiatry Progress Note    Subjective:  IRIS QUEVEDO is a  53y Male.   Seen bedside.   Patient is a 53y old  Male who presents with a chief complaint of R foot cellulitis and abscess (06 Nov 2024 11:30). Patient underwent surgery with Podiatry on 11/02, Excisional Debridement of Soft Tissue and Bone with Incision and Drainage and washout of RF.       Past Medical History and Surgical History  PAST MEDICAL & SURGICAL HISTORY:  DVT (deep venous thrombosis)  5 yr- left  4 yrs- right  pe - 4 yrs      Pulmonary embolism      History of surgery  IVC FILTER           Objective:  Vital Signs Last 24 Hrs  T(C): 36.6 (06 Nov 2024 06:53), Max: 37 (05 Nov 2024 14:56)  T(F): 97.9 (06 Nov 2024 06:53), Max: 98.6 (05 Nov 2024 14:56)  HR: 70 (06 Nov 2024 06:53) (70 - 82)  BP: 124/65 (06 Nov 2024 06:53) (109/73 - 124/65)  BP(mean): --  RR: 18 (06 Nov 2024 06:53) (18 - 18)  SpO2: 98% (06 Nov 2024 06:53) (98% - 98%)                            13.9   5.05  )-----------( 326      ( 06 Nov 2024 08:18 )             41.2                 11-06    140  |  103  |  14  ----------------------------<  140[H]  5.3[H]   |  23  |  0.8    Ca    8.6      06 Nov 2024 08:18  Phos  3.4     11-06  Mg     2.0     11-06    TPro  6.6  /  Alb  3.4[L]  /  TBili  0.2  /  DBili  x   /  AST  149[H]  /  ALT  110[H]  /  AlkPhos  71  11-06        Physical Exam - Lower Extremity Focused:   Derm: Sutures intact at surgical site at right 1st submetatarsal, no signs of drainage with compression after removal of iodoform packing. Minimal hyperkeratosis and erythema surrounding surgical site.   Vascular: DP and PT Pulses Intact, Foot is Warm to Warm to the touch; Capillary Refill Time < 3 Seconds;    Neuro: Protective Sensation Diminished / Moderately Neuropathic   MSK: Pain On Palpation at Wound Site     Assessment:  11/02, Excisional Debridement of Soft Tissue and Bone with Incision and Drainage and washout of RF.     Plan:  Chart reviewed and Patient evaluated. All Questions and Concerns Addressed and Answered  MRI results- showed signs of Phlegm in the right 1st submetarsal, however, patient does not want further surgical intervention.   Local Wound Care; Wound dressed with Xeroform- Gauze- Abdominal Pad- Kerlix- Ace bandage.   Weight Bearing Status; WBAT   Continue w/ Local Wound Care; Q24 Dressing Changes;  Recommend Antibiotics as per ID.   No Further Sx Debridement;   Patient Stable Per Podiatry Standpoint; Follow Up as an Outpatient w/ Dr. Ivy.   Discussed Plan w/ Attending; Dr. Ivy.     Podiatry

## 2024-11-06 NOTE — PROGRESS NOTE ADULT - SUBJECTIVE AND OBJECTIVE BOX
IRIS QUEVEDO  53y, Male  Allergy: No Known Allergies      LOS  5d    CHIEF COMPLAINT: Toe Abscess (02 Nov 2024 15:30)      INTERVAL EVENTS/HPI  - No acute events overnight  - T(F): , Max: 98.6 (11-05-24 @ 14:56)  - reviewed MRI with patient -- does not want to undergo additional surgery since he is tired of being in hospital   - WBC Count: 5.05 (11-06-24 @ 08:18)  WBC Count: 6.33 (11-05-24 @ 07:35)     - Creatinine: 0.8 (11-06-24 @ 08:18)  Creatinine: 0.8 (11-05-24 @ 07:35)       ROS  General: Denies rigors, nightsweats  HEENT: Denies headache, rhinorrhea, sore throat, eye pain  CV: Denies CP, palpitations  PULM: Denies wheezing, hemoptysis  GI: Denies hematemesis, hematochezia, melena  : Denies discharge, hematuria  MSK: Denies arthralgias, myalgias  SKIN: Denies rash, lesions  NEURO: Denies paresthesias, weakness  PSYCH: Denies depression, anxiety    VITALS:  T(F): 97.9, Max: 98.6 (11-05-24 @ 14:56)  HR: 70  BP: 124/65  RR: 18Vital Signs Last 24 Hrs  T(C): 36.6 (06 Nov 2024 06:53), Max: 37 (05 Nov 2024 14:56)  T(F): 97.9 (06 Nov 2024 06:53), Max: 98.6 (05 Nov 2024 14:56)  HR: 70 (06 Nov 2024 06:53) (70 - 82)  BP: 124/65 (06 Nov 2024 06:53) (109/73 - 124/65)  BP(mean): --  RR: 18 (06 Nov 2024 06:53) (18 - 18)  SpO2: 98% (06 Nov 2024 06:53) (98% - 98%)        PHYSICAL EXAM:  Gen: NAD, resting in bed  HEENT: Normocephalic, atraumatic  Neck: supple, no lymphadenopathy  CV: Regular rate & regular rhythm  Lungs: decreased BS at bases, no fremitus  Abdomen: Soft, BS present  Ext: Warm, well perfused  Neuro: non focal, awake  Skin: no rash, no erythema  Lines: no phlebitis    FH: Non-contributory  Social Hx: Non-contributory    TESTS & MEASUREMENTS:                        13.9   5.05  )-----------( 326      ( 06 Nov 2024 08:18 )             41.2     11-06    140  |  103  |  14  ----------------------------<  140[H]  5.3[H]   |  23  |  0.8    Ca    8.6      06 Nov 2024 08:18  Phos  3.4     11-06  Mg     2.0     11-06    TPro  6.6  /  Alb  3.4[L]  /  TBili  0.2  /  DBili  x   /  AST  149[H]  /  ALT  110[H]  /  AlkPhos  71  11-06      LIVER FUNCTIONS - ( 06 Nov 2024 08:18 )  Alb: 3.4 g/dL / Pro: 6.6 g/dL / ALK PHOS: 71 U/L / ALT: 110 U/L / AST: 149 U/L / GGT: x           Urinalysis Basic - ( 06 Nov 2024 08:18 )    Color: x / Appearance: x / SG: x / pH: x  Gluc: 140 mg/dL / Ketone: x  / Bili: x / Urobili: x   Blood: x / Protein: x / Nitrite: x   Leuk Esterase: x / RBC: x / WBC x   Sq Epi: x / Non Sq Epi: x / Bacteria: x        Culture - Acid Fast - Other w/Smear (collected 11-02-24 @ 11:37)  Source: .Other    Culture - Wound Aerobic/Anaerobic (collected 11-02-24 @ 11:37)  Source: .Surgical Swab  Preliminary Report (11-04-24 @ 22:34):    Few Staphylococcus simulans    Few Staphylococcus epidermidis "Susceptibilities not performed"  Organism: Staphylococcus simulans (11-04-24 @ 20:46)  Organism: Staphylococcus simulans (11-04-24 @ 20:46)      -  Clindamycin: S <=0.25      -  Oxacillin: R >2      -  Gentamicin: S <=1 Should not be used as monotherapy      -  Vancomycin: S 1      -  Tetracycline: S <=1      Method Type: LEVI      -  Rifampin: S <=1 Should not be used as monotherapy      -  Erythromycin: S <=0.25      -  Trimethoprim/Sulfamethoxazole: S <=0.5/9.5    Culture - Blood (collected 11-01-24 @ 10:40)  Source: .Blood BLOOD  Preliminary Report (11-05-24 @ 16:01):    No growth at 4 days    Culture - Blood (collected 11-01-24 @ 10:40)  Source: .Blood BLOOD  Preliminary Report (11-05-24 @ 16:01):    No growth at 4 days        Lactate, Blood: 1.2 mmol/L (11-01-24 @ 10:40)      INFECTIOUS DISEASES TESTING      INFLAMMATORY MARKERS  Sedimentation Rate, Erythrocyte: 40 mm/Hr (11-01-24 @ 19:59)  C-Reactive Protein: 170.0 mg/L (11-01-24 @ 19:59)      RADIOLOGY & ADDITIONAL TESTS:  I have personally reviewed the last available Chest xray  CXR      CT      CARDIOLOGY TESTING  12 Lead ECG:   Ventricular Rate 74 BPM    Atrial Rate 74 BPM    P-R Interval 170 ms    QRS Duration 108 ms    Q-T Interval 386 ms    QTC Calculation(Bazett) 428 ms    P Axis 55 degrees    R Axis 100 degrees    T Axis 29 degrees    Diagnosis Line Normal sinus rhythm  Rightward axis  Incomplete right bundle branch block  Borderline ECG    Confirmed by Guanakito Zamora ([id number]) on 11/2/2024 12:34:45 AM (11-01-24 @ 14:08)      MEDICATIONS  buprenorphine 8 mG/naloxone 2 mG SL  Tablet 1 SubLingual two times a day  cefTRIAXone   IVPB 2000 IV Intermittent every 24 hours  dextrose 5%. 1000 IV Continuous <Continuous>  dextrose 5%. 1000 IV Continuous <Continuous>  dextrose 50% Injectable 12.5 IV Push once  dextrose 50% Injectable 25 IV Push once  dextrose 50% Injectable 25 IV Push once  enoxaparin Injectable 130 SubCutaneous every 12 hours  glucagon  Injectable 1 IntraMuscular once  influenza   Vaccine 0.5 IntraMuscular once  insulin glargine Injectable (LANTUS) 5 SubCutaneous at bedtime  insulin lispro (ADMELOG) corrective regimen sliding scale  SubCutaneous three times a day before meals  insulin lispro Injectable (ADMELOG) 2 SubCutaneous three times a day before meals  linezolid    Tablet 600 Oral every 12 hours  metroNIDAZOLE    Tablet 500 Oral every 8 hours  polyethylene glycol 3350 17 Oral daily      WEIGHT  Weight (kg): 131.5 (11-02-24 @ 10:52)  Creatinine: 0.8 mg/dL (11-06-24 @ 08:18)      ANTIBIOTICS:  cefTRIAXone   IVPB 2000 milliGRAM(s) IV Intermittent every 24 hours  linezolid    Tablet 600 milliGRAM(s) Oral every 12 hours  metroNIDAZOLE    Tablet 500 milliGRAM(s) Oral every 8 hours      All available historical records have been reviewed

## 2024-11-06 NOTE — DISCHARGE NOTE PROVIDER - NSDCMRMEDTOKEN_GEN_ALL_CORE_FT
doxycycline hyclate 100 mg oral tablet: 1 tab(s) orally twice a day after breakfast and dinner  ibuprofen 600 mg oral tablet: 1 tab(s) orally 2 times a day as needed for  severe pain  levoFLOXacin 750 mg oral tablet: 1 tab(s) orally once a day  Suboxone 8 mg-2 mg sublingual tablet: 1 tab(s) sublingual 3 times a day   doxycycline hyclate 100 mg oral tablet: 1 tab(s) orally twice a day after breakfast and dinner  ibuprofen 600 mg oral tablet: 1 tab(s) orally 2 times a day as needed for  severe pain  levoFLOXacin 750 mg oral tablet: 1 tab(s) orally once a day  oxyCODONE 5 mg oral tablet: 1 tab(s) orally every 6 hours as needed for  severe pain MDD: 4 tabs in 24hr period; please take only as prescribed  Suboxone 8 mg-2 mg sublingual tablet: 1 tab(s) sublingual 3 times a day

## 2024-11-06 NOTE — DISCHARGE NOTE NURSING/CASE MANAGEMENT/SOCIAL WORK - FINANCIAL ASSISTANCE
[facility] provides services at a reduced cost to those who are determined to be eligible through [facility]’s financial assistance program. Information regarding [facility]’s financial assistance program can be found by going to [email or web address] or by calling [phone].

## 2024-11-06 NOTE — PROGRESS NOTE ADULT - SUBJECTIVE AND OBJECTIVE BOX
INTERVAL HPI/OVERNIGHT EVENTS:  Patient was seen and examined at bedside. As per nurse and patient, no o/n events, patient resting comfortably. No complaints at this time. Patient denies: fever, chills, dizziness, weakness, HA, Changes in vision, CP, palpitations, SOB, cough, N/V/D/C, dysuria, changes in bowel movements, LE edema. ROS otherwise negative.    VITAL SIGNS:  T(F): 97.9 (11-06-24 @ 06:53)  HR: 70 (11-06-24 @ 06:53)  BP: 124/65 (11-06-24 @ 06:53)  RR: 18 (11-06-24 @ 06:53)  SpO2: 98% (11-06-24 @ 06:53)  Wt(kg): --    PHYSICAL EXAM:    Constitutional: WDWN, NAD  HEENT: PERRL, EOMI, sclera non-icteric, neck supple, trachea midline, no masses, no JVD, MMM, good dentition  Respiratory: CTA b/l, good air entry b/l, no wheezing, no rhonchi, no rales, without accessory muscle use and no intercostal retractions  Cardiovascular: RRR, normal S1S2, no M/R/G  Gastrointestinal: soft, NTND, no masses palpable, BS normal  Extremities: Warm, well perfused, pulses equal bilateral upper and lower extremities, no edema, no clubbing  Neurological: AAOx3, CN Grossly intact  Skin: Normal temperature, warm, dry    MEDICATIONS  (STANDING):  buprenorphine 8 mG/naloxone 2 mG SL  Tablet 1 Tablet(s) SubLingual two times a day  cefTRIAXone   IVPB 2000 milliGRAM(s) IV Intermittent every 24 hours  dextrose 5%. 1000 milliLiter(s) (50 mL/Hr) IV Continuous <Continuous>  dextrose 5%. 1000 milliLiter(s) (100 mL/Hr) IV Continuous <Continuous>  dextrose 50% Injectable 25 Gram(s) IV Push once  dextrose 50% Injectable 12.5 Gram(s) IV Push once  dextrose 50% Injectable 25 Gram(s) IV Push once  enoxaparin Injectable 130 milliGRAM(s) SubCutaneous every 12 hours  glucagon  Injectable 1 milliGRAM(s) IntraMuscular once  influenza   Vaccine 0.5 milliLiter(s) IntraMuscular once  insulin glargine Injectable (LANTUS) 5 Unit(s) SubCutaneous at bedtime  insulin lispro (ADMELOG) corrective regimen sliding scale   SubCutaneous three times a day before meals  insulin lispro Injectable (ADMELOG) 2 Unit(s) SubCutaneous three times a day before meals  linezolid    Tablet 600 milliGRAM(s) Oral every 12 hours  metroNIDAZOLE    Tablet 500 milliGRAM(s) Oral every 8 hours  polyethylene glycol 3350 17 Gram(s) Oral daily    MEDICATIONS  (PRN):  dextrose Oral Gel 15 Gram(s) Oral once PRN Blood Glucose LESS THAN 70 milliGRAM(s)/deciliter  morphine  - Injectable 4 milliGRAM(s) IV Push every 4 hours PRN Severe Pain (7 - 10)      Allergies    No Known Allergies    Intolerances        LABS:                        13.9   5.05  )-----------( 326      ( 06 Nov 2024 08:18 )             41.2     11-05    137  |  101  |  15  ----------------------------<  157[H]  4.7   |  22  |  0.8    Ca    8.5      05 Nov 2024 07:35  Phos  2.8     11-05  Mg     2.3     11-05    TPro  6.5  /  Alb  3.5  /  TBili  0.9  /  DBili  x   /  AST  72[H]  /  ALT  48[H]  /  AlkPhos  70  11-05      Urinalysis Basic - ( 05 Nov 2024 07:35 )    Color: x / Appearance: x / SG: x / pH: x  Gluc: 157 mg/dL / Ketone: x  / Bili: x / Urobili: x   Blood: x / Protein: x / Nitrite: x   Leuk Esterase: x / RBC: x / WBC x   Sq Epi: x / Non Sq Epi: x / Bacteria: x        RADIOLOGY & ADDITIONAL TESTS:  Reviewed

## 2024-11-06 NOTE — DISCHARGE NOTE PROVIDER - CARE PROVIDERS DIRECT ADDRESSES
,DirectAddress_Unknown,[email or web address]

## 2024-11-06 NOTE — DISCHARGE NOTE PROVIDER - CARE PROVIDER_API CALL
LIUDMILA TYLER  [address] [facility]  [address]  [place] [address]  Phone: [phone]  Fax: [phone]  Established Patient  Follow Up Time: 1 week    Henry Ivy  Podiatric Medicine  [address] [facility] [facility] [address] [place] [address]  Phone: [phone]  Fax: [phone]  Established Patient  Follow Up Time: 1 week    Jose Bryson  Infectious Disease  [address] [place] [address]  Phone: [phone]  Fax: [phone]  Established Patient  Follow Up Time: 1 week

## 2024-11-06 NOTE — PROGRESS NOTE ADULT - TIME BILLING
I have personally seen and examined this patient.    I have reviewed all pertinent clinical information and reviewed all relevant imaging and diagnostic studies personally.   I counseled the patient about diagnostic testing and treatment plan. All questions were answered.   I discussed recommendations with the primary team.
Direct patient care, interdisciplinary rounds
I have personally seen and examined this patient.    I have reviewed all pertinent clinical information and reviewed all relevant imaging and diagnostic studies personally.   I counseled the patient about diagnostic testing and treatment plan. All questions were answered.   I discussed recommendations with the primary team.

## 2024-11-06 NOTE — DISCHARGE NOTE PROVIDER - NSDCFUADDAPPT_GEN_ALL_CORE_FT
Do you need a primary care doctor or follow-up with a specialist? Our care coordinators will help you find providers near you and schedule any follow-up care visits.    Monday-Friday: 9am-5pm    Call our [facility] team: [phone]

## 2024-11-06 NOTE — DISCHARGE NOTE PROVIDER - NSDCCPCAREPLAN_GEN_ALL_CORE_FT
PRINCIPAL DISCHARGE DIAGNOSIS  Diagnosis: Foot abscess, right  Assessment and Plan of Treatment: You came to the ED because you had a fever with right foot swelling, redness, and pain. You were found to have an infection of the skin called cellulitis with abscess and collection formation. You were started on IV antibiotics and you underwent surgical debridement by podiatry. However, you were still noted to phave persistent infection on the MRI of the right foot and podiatry offered a repeat surgery for further drainage of the infection however you preferred to have antibiotics instead. You were seen by infectious disease doctor who recommended long-term intravenous antibiotic through a PICC line however you preferred oral antibiotics instead. nOte that you will have to take these antibiotics for at least 6 weeks and you will have to follow up weekly via telehealth with Dr. Jose Bryson and you will need to do weekly blood tests to monitor for infection control and resolution.  Note that you also had mild elevation of your liver enzymes, most likely secondary to antibitiocs that you received during this admission. Please refrain from drinking alcohol or taking Tylenol beyond the maximum required dosage.  You were also found to have diabetes mellitus as your HbA1c came back at 7.3. You have to follow a low sugar low carbohydrate diet in addition to exercising more frequently (as much as 30 minutes for 5 days per week).  You will need to follow up in the clinic with your PCP Dr. Huynh , the podiatrist Dr. Ivy , and the infectious disease specialist Dr. Bryson within a week from discharge.

## 2024-11-06 NOTE — DISCHARGE NOTE PROVIDER - HOSPITAL COURSE
Case of 53 year old man KTH DVT/PE 10 years ago, s/p IVC filter which is still in place, presented to the ED for right foot pain and swelling after stepping on a nail at work 2 days ago ptp. Patient removed the nail but he started developing swelling, erythema, tenderness around the area of the right first metatarsus. 1 day ptp, he developed an episode of fever 101F so he went to urgent care and they referred him to the ED. He hasn't been following with a PCP and suspects he might have diabetes.    Xray of R foot/ankle did not show any acute changes. Seen by podiatry, sp debridement. Seen by ID, started empirically on IV cefepime, vancomycin, and Flagyl. Wound Cx grew staph epidermidis and staph simulans. Patient has had persistent erythema of the 1st big toe and IP joint for which an MRI was done and showed plantar wound to the level of the first MTP, with findings consistent with septic arthritis of the first MTP with osteomyelitis at the tibial sesamoid and plantar aspect of the medial first metatarsal head.  Phlegmon surrounding the wound, with small abscesses along plantar surface of the flexor pollicis longus tendon largest measuring 1.6 x 0.8 x 0.4 cm at the level of the first interphalangeal joint.  Podiatry recommended a repeat surgery for further debridement however the patient declined that option. ID recommended IV antibiotics for 6 weeks via PICC line however patient preferred oral alternatives.    He was noted to have HbA1c of 7.3 (11/2/2024). Dietary and lifestyle modification counseling was provided for the patient.  He was also noted to have transaminitis, suspecting DILI. Will discharge the patient with close monitoring of LFTs with weekly labs.    Discussion of discharge plan of care, including discharge diagnoses, medication reconciliation, and follow-ups was conducted with Dr. Gomez on 11/6/2024 and discharge was approved.

## 2024-11-06 NOTE — PROGRESS NOTE ADULT - ASSESSMENT
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 Patient removed the nail but he started developing swelling, erythema, tenderness around the area of the right first metatarsus. Yesterday, he developed an episode of fever 101F so he went to urgent care and they referred him to the ED. He hasn't been following with a PCP and suspects he might have diabetes. No recent DTap given.     #Right foot abscess with cellulitis  - Hemodynamically stable, Sat 97% on RA, 101F at home  - WBC 13.25K, Hb 12.5, INR 1.07, Lytes Nl, Crea 0.8, Glu 265, LFTs Nl, lactate 1.2  - R foot/ankle xray: no acute bony changes  - POD3 debridement  - Wound Cx: staph simulans and staph epidermidis  - C/w cefepime 2g IV q8h, flagyl 500mg TID, narrowed cefepime to IV ceftriaxone  - IV vancomycin switched to PO Linezolid  - Per podiatry: persistent erythema to the hallux. no fluctuance appreciated. MRI R foot 11/5: Plantar wound to the level of the first MTP, with findings consistent  with septic arthritis of the first MTP with osteomyelitis at the tibial  sesamoid and plantar aspect of the medial first metatarsal head. Phlegmon surrounding the wound, with small abscesses along plantar surface of the flexor pollicis longus tendon largest measuring 1.6 x 0.8 x 0.4 cm at the level of the first interphalangeal joint.  - Pt declining repeat surgery    #Bilateral DVTs  #Hx IVC placement 2014  - Duplex showed findings concerning for bilateral DVT in the popliteal vein  - Patient was started on therapeutic lovenox  - Pt states that there was an attempt to remove the IVC filter back in the past however they were never able to    DVT ppx: lovenox  GI ppx: not indicated  AAT  Diet: regular  Dispo: med    Pending: podiatry and ID plan

## 2024-11-06 NOTE — DISCHARGE NOTE NURSING/CASE MANAGEMENT/SOCIAL WORK - NSDCPEFALRISK_GEN_ALL_CORE
For information on Fall & Injury Prevention, visit: [email or web address] OR  [email or web address] OR  https://www.cdc.gov/steadi/patient.html

## 2024-11-06 NOTE — DISCHARGE NOTE NURSING/CASE MANAGEMENT/SOCIAL WORK - NSDCVIVACCINE_GEN_ALL_CORE_FT
Tdap; 01-Nov-2024 10:55; Henry Frazier); [employer]; [id number] (Exp. Date: 01-May-2026); IntraMuscular; Deltoid Right.; 0.5 milliLiter(s); VIS (VIS Published: 09-May-2013, VIS Presented: 01-Nov-2024);

## 2024-11-07 LAB
CULTURE RESULTS: ABNORMAL
ORGANISM # SPEC MICROSCOPIC CNT: ABNORMAL
ORGANISM # SPEC MICROSCOPIC CNT: SIGNIFICANT CHANGE UP
SPECIMEN SOURCE: SIGNIFICANT CHANGE UP

## 2024-11-11 DIAGNOSIS — D53.9 NUTRITIONAL ANEMIA, UNSPECIFIED: ICD-10-CM

## 2024-11-11 DIAGNOSIS — L02.611 CUTANEOUS ABSCESS OF RIGHT FOOT: ICD-10-CM

## 2024-11-11 DIAGNOSIS — Z95.828 PRESENCE OF OTHER VASCULAR IMPLANTS AND GRAFTS: ICD-10-CM

## 2024-11-11 DIAGNOSIS — S91.331A PUNCTURE WOUND WITHOUT FOREIGN BODY, RIGHT FOOT, INITIAL ENCOUNTER: ICD-10-CM

## 2024-11-11 DIAGNOSIS — E11.40 TYPE 2 DIABETES MELLITUS WITH DIABETIC NEUROPATHY, UNSPECIFIED: ICD-10-CM

## 2024-11-11 DIAGNOSIS — I82.453 ACUTE EMBOLISM AND THROMBOSIS OF PERONEAL VEIN, BILATERAL: ICD-10-CM

## 2024-11-11 DIAGNOSIS — I82.433 ACUTE EMBOLISM AND THROMBOSIS OF POPLITEAL VEIN, BILATERAL: ICD-10-CM

## 2024-11-11 DIAGNOSIS — E66.9 OBESITY, UNSPECIFIED: ICD-10-CM

## 2024-11-11 DIAGNOSIS — B95.7 OTHER STAPHYLOCOCCUS AS THE CAUSE OF DISEASES CLASSIFIED ELSEWHERE: ICD-10-CM
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
## 2024-11-11 DIAGNOSIS — L03.115 CELLULITIS OF RIGHT LOWER LIMB: ICD-10-CM

## 2024-11-11 DIAGNOSIS — F11.20 OPIOID DEPENDENCE, UNCOMPLICATED: ICD-10-CM

## 2024-11-11 DIAGNOSIS — I87.2 VENOUS INSUFFICIENCY (CHRONIC) (PERIPHERAL): ICD-10-CM
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
## 2025-03-11 NOTE — PRE-ANESTHESIA EVALUATION ADULT - NSANTHRISKNONERD_GEN_ALL_CORE
No risk alerts present Patient called to schedule a consult with a provider here. Writer instructed patient to have a referral sent to our clinic including the referring provider and diagnosis so we can have our providers triage to determine when to schedule patient. Patient will have a referral faxed to clinic. Fax number given.    Records received. Per Shiloh MOONEY, patient needs to see GynOnc first.    Referral received. Request for medical records sent to [facility]. Need records that support diagnosis received.    normal...

## 2025-03-19 NOTE — ED ADULT NURSE NOTE - SUICIDE SCREENING QUESTION 1
----- Message from JOSELIN Diego sent at 3/19/2025 12:47 PM CDT -----  Regarding: RE: Callback  Contact: [phone]  Please call and tell her I am on it.  ----- Message -----  From: Ana Gonzalez MA  Sent: 3/19/2025  12:32 PM CDT  To: Brandie Monae RN  Subject: FW: Callback                                       ----- Message -----  From: Thalia Smith  Sent: 3/19/2025  12:26 PM CDT  To: Lila Dodge Staff  Subject: Callback                                         Patient calling requesting a callback from nurse or provider in regards to pre services telling her the provider still haven't sent over the correct coding for tomorrows MRI. Please call back as soon as possible.  
Returned patients call to advise that Mrs. Diego and Dr. Sharp are working on the coding for her MRI tomorrow   
No

## 2025-06-19 NOTE — PATIENT PROFILE ADULT - DO YOU FEEL THREATENED BY OTHERS?
The patient has been examined and the H&P has been reviewed:    I concur with the findings and no changes have occurred since H&P was written.    Surgery risks, benefits and alternative options discussed and understood by patient/family.    Consented      There are no hospital problems to display for this patient.     no